# Patient Record
Sex: MALE | URBAN - METROPOLITAN AREA
[De-identification: names, ages, dates, MRNs, and addresses within clinical notes are randomized per-mention and may not be internally consistent; named-entity substitution may affect disease eponyms.]

---

## 2018-07-17 ENCOUNTER — OFFICE VISIT (OUTPATIENT)
Dept: FAMILY MEDICINE CLINIC | Age: 43
End: 2018-07-17

## 2018-07-17 VITALS
OXYGEN SATURATION: 98 % | DIASTOLIC BLOOD PRESSURE: 94 MMHG | BODY MASS INDEX: 27.78 KG/M2 | HEIGHT: 67 IN | TEMPERATURE: 98 F | WEIGHT: 177 LBS | SYSTOLIC BLOOD PRESSURE: 133 MMHG | RESPIRATION RATE: 16 BRPM | HEART RATE: 77 BPM

## 2018-07-17 DIAGNOSIS — F41.9 ANXIETY: ICD-10-CM

## 2018-07-17 DIAGNOSIS — R25.1 OCCASIONAL TREMORS: ICD-10-CM

## 2018-07-17 DIAGNOSIS — R03.0 ELEVATED BLOOD PRESSURE READING: ICD-10-CM

## 2018-07-17 DIAGNOSIS — R68.89 HEAT INTOLERANCE: ICD-10-CM

## 2018-07-17 DIAGNOSIS — E55.9 VITAMIN D DEFICIENCY: ICD-10-CM

## 2018-07-17 DIAGNOSIS — Z00.00 ANNUAL PHYSICAL EXAM: Primary | ICD-10-CM

## 2018-07-17 DIAGNOSIS — R00.2 PALPITATION: ICD-10-CM

## 2018-07-17 NOTE — PROGRESS NOTES
Claude Challenger. Chief Complaint   Patient presents with   Melania Hanson Other     has 4 symptoms related to thyroid issues. Vitals:    07/17/18 1342   BP: (!) 133/94   Pulse: 77   Resp: 16   Temp: 98 °F (36.7 °C)   TempSrc: Oral   SpO2: 98%   Weight: 177 lb (80.3 kg)   Height: 5' 7.2\" (1.707 m)   PainSc:   0 - No pain         HPI: Patient is here to establish care and to get an annual physical.  Also he has few complaints or concerns he has been having on and off palpitations, that comes and it comes on when he addressed, not associated with exertion, not associated with chest pain or shortness of breath and it takes a few minutes and then goes away. Also patient has excessive sweating and heat intolerance for many years. He has social and situational anxiety that associated with sweating and tremor in both hands. His VENKATA 7 score was 6. No unintentional weight loss or weight gain. Patient does not smoke cigarettes he quit 2 years ago and now smokes vape does not drink alcohol. Past Medical History:   Diagnosis Date    Prehypertension      History reviewed. No pertinent surgical history. Social History   Substance Use Topics    Smoking status: Former Smoker    Smokeless tobacco: Current User    Alcohol use Yes      Comment: very occassional       Family History   Problem Relation Age of Onset    Thyroid Disease Mother        Review of Systems   Constitutional: Negative for chills, fever, malaise/fatigue and weight loss. HENT: Negative for congestion, ear discharge, ear pain, hearing loss, nosebleeds, sinus pain and sore throat. Eyes: Negative for blurred vision, double vision and discharge. Respiratory: Negative for cough, hemoptysis, sputum production, shortness of breath and wheezing. Cardiovascular: Positive for palpitations. Negative for chest pain, claudication and leg swelling. Gastrointestinal: Negative for abdominal pain, constipation, diarrhea, nausea and vomiting. Genitourinary: Negative for dysuria, frequency, hematuria and urgency. Musculoskeletal: Negative for back pain, joint pain, myalgias and neck pain. Skin: Negative for itching and rash. Neurological: Positive for tremors. Negative for dizziness, tingling, sensory change, speech change, focal weakness, weakness and headaches. Psychiatric/Behavioral: Negative for depression, hallucinations, substance abuse and suicidal ideas. The patient is nervous/anxious. The patient does not have insomnia. Physical Exam   Constitutional: He is oriented to person, place, and time. He appears well-developed and well-nourished. No distress. HENT:   Head: Normocephalic and atraumatic. Mouth/Throat: No oropharyngeal exudate. Eyes: Conjunctivae are normal. Pupils are equal, round, and reactive to light. Right eye exhibits no discharge. Left eye exhibits no discharge. No scleral icterus. Neck: Normal range of motion. Neck supple. No thyromegaly present. Cardiovascular: Normal rate, regular rhythm and normal heart sounds. Pulmonary/Chest: Effort normal and breath sounds normal. No respiratory distress. He has no rales. Abdominal: Soft. Bowel sounds are normal. He exhibits no distension and no mass. There is no tenderness. There is no rebound. Musculoskeletal: Normal range of motion. He exhibits no edema, tenderness or deformity. Bilateral hand tremors    Lymphadenopathy:     He has no cervical adenopathy. Neurological: He is alert and oriented to person, place, and time. No cranial nerve deficit. Coordination normal.   Skin: Skin is warm and dry. No rash noted. He is not diaphoretic. No erythema. Psychiatric: He has a normal mood and affect. Judgment and thought content normal.        Assessment and plan     1. Heat intolerance    - TSH 3RD GENERATION; Future  - TSH 3RD GENERATION    2. Elevated blood pressure reading      3. Anxiety    - TSH 3RD GENERATION; Future  - TSH 3RD GENERATION    4. Palpitation   TSH 3RD GENERATION; Future  - TSH 3RD GENERATION    5. Annual physical exam    - CBC WITH AUTOMATED DIFF; Future  - LIPID PANEL; Future  - METABOLIC PANEL, COMPREHENSIVE; Future  - CBC WITH AUTOMATED DIFF  - LIPID PANEL  - METABOLIC PANEL, COMPREHENSIVE    6. Occasional tremors    - TSH 3RD GENERATION; Future  - VITAMIN B12; Future  - TSH 3RD GENERATION  - VITAMIN B12    7. Vitamin D deficiency    - VITAMIN D, 25 HYDROXY; Future  - VITAMIN D, 25 HYDROXY        There are no active problems to display for this patient. Results for orders placed or performed in visit on 12/11/17   URINE C&S   Result Value Ref Range    FINAL REPORT Microbiology results    AMB POC URINALYSIS DIP STICK AUTO W/O MICRO   Result Value Ref Range    Color (UA POC) Yellow     Clarity (UA POC) Clear     Glucose (UA POC) Negative Negative    Bilirubin (UA POC) Negative Negative    Ketones (UA POC) Negative Negative    Specific gravity (UA POC) 1.025 1.001 - 1.035    Blood (UA POC) Negative Negative    pH (UA POC) 5.0 4.6 - 8.0    Protein (UA POC) Negative Negative    Urobilinogen (UA POC) 0.2 mg/dL 0.2 - 1    Nitrites (UA POC) Negative Negative    Leukocyte esterase (UA POC) Negative Negative     No visits with results within 3 Month(s) from this visit.   Latest known visit with results is:    Office Visit on 12/11/2017   Component Date Value Ref Range Status    Color (UA POC) 12/11/2017 Yellow   Final    Clarity (UA POC) 12/11/2017 Clear   Final    Glucose (UA POC) 12/11/2017 Negative  Negative Final    Bilirubin (UA POC) 12/11/2017 Negative  Negative Final    Ketones (UA POC) 12/11/2017 Negative  Negative Final    Specific gravity (UA POC) 12/11/2017 1.025  1.001 - 1.035 Final    Blood (UA POC) 12/11/2017 Negative  Negative Final    pH (UA POC) 12/11/2017 5.0  4.6 - 8.0 Final    Protein (UA POC) 12/11/2017 Negative  Negative Final    Urobilinogen (UA POC) 12/11/2017 0.2 mg/dL  0.2 - 1 Final    Nitrites (UA POC) 12/11/2017 Negative  Negative Final    Leukocyte esterase (UA POC) 12/11/2017 Negative  Negative Final    FINAL REPORT 12/11/2017 Microbiology results   Final    Comment: ANTIBIOTIC ALLERGIES  Penicillin    URINE SOURCE:  Voided    COLONY COUNT / RESULT:   No Growth at 48 Hours            Follow-up Disposition:  Return in about 1 week (around 7/24/2018).

## 2018-07-17 NOTE — PROGRESS NOTES
Author . is a 41 y.o. male presents in office to be seen for thyroid issues. Health Maintenance Due   Topic Date Due    DTaP/Tdap/Td series (1 - Tdap) 04/28/1998       1. Have you been to the ER, urgent care clinic since your last visit? Hospitalized since your last visit?no    2. Have you seen or consulted any other health care providers outside of the [address] [place] [address] since your last visit? Include any pap smears or colon screening.  no

## 2018-07-17 NOTE — MR AVS SNAPSHOT
Zayra Banquete 
 
 
 [address] [facility] [address] 
[phone] Patient: Caitlin Balbuena. MRN: [id number] :1975 Visit Information Date & Time Provider Department Dept. Phone Encounter #  
 2018  1:45 PM Gaby Louise MD [facility]note 3] [facility] [phone] [id number] Follow-up Instructions Return in about 1 week (around 2018). Upcoming Health Maintenance Date Due DTaP/Tdap/Td series (1 - Tdap) 1998 Influenza Age 5 to Adult 2018 Allergies as of 2018  Review Complete On: 2018 By: Gaby Louise MD  
  
 Severity Noted Reaction Type Reactions Penicillin  2015    Itching Penicillin G  2017    Other (comments) Current Immunizations  Never Reviewed No immunizations on file. Not reviewed this visit You Were Diagnosed With   
  
 Codes Comments Annual physical exam    -  Primary ICD-10-CM: Z00.00 ICD-9-CM: V70.0 Heat intolerance     ICD-10-CM: R68.89 ICD-9-CM: 780.99 Elevated blood pressure reading     ICD-10-CM: R03.0 ICD-9-CM: 796.2 Anxiety     ICD-10-CM: F41.9 ICD-9-CM: 300.00 Palpitation     ICD-10-CM: R00.2 ICD-9-CM: 785.1 Occasional tremors     ICD-10-CM: R25.1 ICD-9-CM: 424. 0 Vitamin D deficiency     ICD-10-CM: E55.9 ICD-9-CM: 268.9 Vitals BP Pulse Temp Resp Height(growth percentile) Weight(growth percentile) (!) 133/94 77 98 °F (36.7 °C) (Oral) 16 5' 7.2\" (1.707 m) 177 lb (80.3 kg) SpO2 BMI Smoking Status 98% 27.56 kg/m2 Former Smoker Vitals History BMI and BSA Data Body Mass Index Body Surface Area  
 27.56 kg/m 2 1.95 m 2 Preferred Pharmacy Pharmacy Name Phone [address], [address] [place] AT [address] [phone] Your Updated Medication List  
  
 Notice  As of 7/17/2018  2:29 PM  
 You have not been prescribed any medications. We Performed the Following CBC WITH AUTOMATED DIFF [80352 CPT(R)] LIPID PANEL [68191 CPT(R)] METABOLIC PANEL, COMPREHENSIVE [23929 CPT(R)] TSH 3RD GENERATION [49013 CPT(R)] VITAMIN B12 [id number] CPT(R)] VITAMIN D, 25 HYDROXY [id number] CPT(R)] Follow-up Instructions Return in about 1 week (around 7/24/2018). To-Do List   
 07/17/2018 Lab:  CBC WITH AUTOMATED DIFF   
  
 07/17/2018 Lab:  LIPID PANEL   
  
 07/17/2018 Lab:  METABOLIC PANEL, COMPREHENSIVE   
  
 07/17/2018 Lab:  TSH 3RD GENERATION   
  
 07/17/2018 Lab:  VITAMIN B12   
  
 07/17/2018 Lab:  VITAMIN D, 25 HYDROXY Introducing [facility]! Napoleon Villagran introduces [employer] patient portal. Now you can access parts of your medical record, email your doctor's office, and request medication refills online. 1. In your internet browser, go to [email or web address] 2. Click on the First Time User? Click Here link in the Sign In box. You will see the New Member Sign Up page. 3. Enter your [employer] Access Code exactly as it appears below. You will not need to use this code after youve completed the sign-up process. If you do not sign up before the expiration date, you must request a new code. · [employer] Access Code: [email or web address] Expires: 10/15/2018  2:19 PM 
 
4. Enter the last four digits of your Social Security Number (xxxx) and Date of Birth (mm/dd/yyyy) as indicated and click Submit. You will be taken to the next sign-up page. 5. Create a [employer] ID. This will be your [employer] login ID and cannot be changed, so think of one that is secure and easy to remember. 6. Create a [employer] password. You can change your password at any time. 7. Enter your Password Reset Question and Answer. This can be used at a later time if you forget your password. 8. Enter your e-mail address. You will receive e-mail notification when new information is available in [address] 9. Click Sign Up. You can now view and download portions of your medical record. 10. Click the Download Summary menu link to download a portable copy of your medical information. If you have questions, please visit the Frequently Asked Questions section of the [employer] website. Remember, [employer] is NOT to be used for urgent needs. For medical emergencies, dial 911. Now available from your iPhone and Android! Please provide this summary of care documentation to your next provider. If you have any questions after today's visit, please call [phone].

## 2018-07-18 LAB
25(OH)D3 SERPL-MCNC: 20.4 NG/ML (ref 32–100)
A-G RATIO,AGRAT: 2.3 RATIO (ref 1.1–2.6)
ABSOLUTE LYMPHOCYTE COUNT, 10803: 1.8 K/UL (ref 1–4.8)
ALBUMIN SERPL-MCNC: 4.9 G/DL (ref 3.5–5)
ALP SERPL-CCNC: 80 U/L (ref 25–115)
ALT SERPL-CCNC: 32 U/L (ref 5–40)
ANION GAP SERPL CALC-SCNC: 19 MMOL/L
AST SERPL W P-5'-P-CCNC: 20 U/L (ref 10–37)
BASOPHILS # BLD: 0 K/UL (ref 0–0.2)
BASOPHILS NFR BLD: 1 % (ref 0–2)
BILIRUB SERPL-MCNC: 0.6 MG/DL (ref 0.2–1.2)
BUN SERPL-MCNC: 12 MG/DL (ref 6–22)
CALCIUM SERPL-MCNC: 9.7 MG/DL (ref 8.4–10.4)
CHLORIDE SERPL-SCNC: 99 MMOL/L (ref 98–110)
CHOLEST SERPL-MCNC: 201 MG/DL (ref 110–200)
CO2 SERPL-SCNC: 24 MMOL/L (ref 20–32)
CREAT SERPL-MCNC: 0.9 MG/DL (ref 0.5–1.2)
EOSINOPHIL # BLD: 0.1 K/UL (ref 0–0.5)
EOSINOPHIL NFR BLD: 2 % (ref 0–6)
ERYTHROCYTE [DISTWIDTH] IN BLOOD BY AUTOMATED COUNT: 13.4 % (ref 10–15.5)
GFRAA, 66117: >60
GFRNA, 66118: >60
GLOBULIN,GLOB: 2.1 G/DL (ref 2–4)
GLUCOSE SERPL-MCNC: 91 MG/DL (ref 70–99)
GRANULOCYTES,GRANS: 62 % (ref 40–75)
HCT VFR BLD AUTO: 44.7 % (ref 36.6–51.9)
HDLC SERPL-MCNC: 4.3 MG/DL (ref 0–5)
HDLC SERPL-MCNC: 47 MG/DL (ref 40–59)
HGB BLD-MCNC: 14.7 G/DL (ref 13.2–17.3)
LDLC SERPL CALC-MCNC: 131 MG/DL (ref 50–99)
LYMPHOCYTES, LYMLT: 26 % (ref 20–45)
MCH RBC QN AUTO: 30 PG (ref 26–34)
MCHC RBC AUTO-ENTMCNC: 33 G/DL (ref 31–36)
MCV RBC AUTO: 92 FL (ref 80–95)
MONOCYTES # BLD: 0.7 K/UL (ref 0.1–1)
MONOCYTES NFR BLD: 10 % (ref 3–12)
NEUTROPHILS # BLD AUTO: 4.3 K/UL (ref 1.8–7.7)
PLATELET # BLD AUTO: 348 K/UL (ref 140–440)
PMV BLD AUTO: 10.2 FL (ref 9–13)
POTASSIUM SERPL-SCNC: 4.6 MMOL/L (ref 3.5–5.5)
PROT SERPL-MCNC: 7 G/DL (ref 6.4–8.3)
RBC # BLD AUTO: 4.84 M/UL (ref 3.8–5.8)
SODIUM SERPL-SCNC: 142 MMOL/L (ref 133–145)
TRIGL SERPL-MCNC: 115 MG/DL (ref 40–149)
TSH SERPL DL<=0.005 MIU/L-ACNC: 1.72 MCU/ML (ref 0.27–4.2)
VIT B12 SERPL-MCNC: 390 PG/ML (ref 211–911)
VLDLC SERPL CALC-MCNC: 23 MG/DL (ref 8–30)
WBC # BLD AUTO: 7 K/UL (ref 4–11)

## 2018-07-24 ENCOUNTER — OFFICE VISIT (OUTPATIENT)
Dept: FAMILY MEDICINE CLINIC | Age: 43
End: 2018-07-24

## 2018-07-24 VITALS
WEIGHT: 181 LBS | TEMPERATURE: 97.8 F | DIASTOLIC BLOOD PRESSURE: 89 MMHG | RESPIRATION RATE: 17 BRPM | HEART RATE: 67 BPM | SYSTOLIC BLOOD PRESSURE: 136 MMHG | BODY MASS INDEX: 28.41 KG/M2 | OXYGEN SATURATION: 98 % | HEIGHT: 67 IN

## 2018-07-24 DIAGNOSIS — R03.0 ELEVATED BLOOD PRESSURE READING: ICD-10-CM

## 2018-07-24 DIAGNOSIS — E66.3 OVERWEIGHT (BMI 25.0-29.9): ICD-10-CM

## 2018-07-24 DIAGNOSIS — E55.9 VITAMIN D DEFICIENCY: ICD-10-CM

## 2018-07-24 DIAGNOSIS — R61 EXCESSIVE SWEATING: ICD-10-CM

## 2018-07-24 DIAGNOSIS — G25.0 TREMOR, ESSENTIAL: Primary | ICD-10-CM

## 2018-07-24 RX ORDER — NEBIVOLOL 5 MG/1
5 TABLET ORAL DAILY
Qty: 30 TAB | Refills: 2 | Status: SHIPPED | OUTPATIENT
Start: 2018-07-24 | End: 2018-08-02 | Stop reason: CLARIF

## 2018-07-24 RX ORDER — ERGOCALCIFEROL 1.25 MG/1
50000 CAPSULE ORAL
Qty: 12 CAP | Refills: 0 | Status: SHIPPED | OUTPATIENT
Start: 2018-07-24 | End: 2018-10-22

## 2018-07-24 NOTE — MR AVS SNAPSHOT
Keaton La Plata 
 
 
 [address] [facility] [address] 
[phone] Patient: Ghazal Hurley MRN: [id number] :1975 Visit Information Date & Time Provider Department Dept. Phone Encounter #  
 2018  9:00 AM Edna Rodriguez MD [facility] [phone] [id number] Upcoming Health Maintenance Date Due DTaP/Tdap/Td series (1 - Tdap) 1998 Influenza Age 5 to Adult 2018 Allergies as of 2018  Review Complete On: 2018 By: Edna Rodriguez MD  
  
 Severity Noted Reaction Type Reactions Penicillin  2015    Itching Penicillin G  2017    Other (comments) Current Immunizations  Never Reviewed No immunizations on file. Not reviewed this visit You Were Diagnosed With   
  
 Codes Comments Tremor, essential    -  Primary ICD-10-CM: G25.0 ICD-9-CM: 333.1 Vitamin D deficiency     ICD-10-CM: E55.9 ICD-9-CM: 268.9 Overweight (BMI 25.0-29. 9)     ICD-10-CM: C87.6 ICD-9-CM: 278.02 Excessive sweating     ICD-10-CM: R61 
ICD-9-CM: 780.8 Elevated blood pressure reading     ICD-10-CM: R03.0 ICD-9-CM: 796.2 Vitals BP Pulse Temp Resp Height(growth percentile) Weight(growth percentile) 136/89 67 97.8 °F (36.6 °C) (Oral) 17 5' 7\" (1.702 m) 181 lb (82.1 kg) SpO2 BMI Smoking Status 98% 28.35 kg/m2 Former Smoker Vitals History BMI and BSA Data Body Mass Index Body Surface Area  
 28.35 kg/m 2 1.97 m 2 Preferred Pharmacy Pharmacy Name Phone [address] [facility], [address] [place] [address] AT [address] [phone] Your Updated Medication List  
  
Notice  As of 2018  9:45 AM  
 You have not been prescribed any medications. Introducing [facility]! [facility] introduces [employer] patient portal. Now you can access parts of your medical record, email your doctor's office, and request medication refills online. 1. In your internet browser, go to [email or web address] 2. Click on the First Time User? Click Here link in the Sign In box. You will see the New Member Sign Up page. 3. Enter your [employer] Access Code exactly as it appears below. You will not need to use this code after youve completed the sign-up process. If you do not sign up before the expiration date, you must request a new code. · [employer] Access Code: [email or web address] Expires: 10/15/2018  2:19 PM 
 
4. Enter the last four digits of your Social Security Number (xxxx) and Date of Birth (mm/dd/yyyy) as indicated and click Submit. You will be taken to the next sign-up page. 5. Create a [employer] ID. This will be your [employer] login ID and cannot be changed, so think of one that is secure and easy to remember. 6. Create a [employer] password. You can change your password at any time. 7. Enter your Password Reset Question and Answer. This can be used at a later time if you forget your password. 8. Enter your e-mail address. You will receive e-mail notification when new information is available in [address] 9. Click Sign Up. You can now view and download portions of your medical record. 10. Click the Download Summary menu link to download a portable copy of your medical information. If you have questions, please visit the Frequently Asked Questions section of the [employer] website. Remember, [employer] is NOT to be used for urgent needs. For medical emergencies, dial 911. Now available from your iPhone and Android! Please provide this summary of care documentation to your next provider. If you have any questions after today's visit, please call [phone].

## 2018-07-24 NOTE — PROGRESS NOTES
Sharlene Brenner. Chief Complaint   Patient presents with    Hypertension     Vitals:    07/24/18 0856   BP: 136/89   Pulse: 67   Resp: 17   Temp: 97.8 °F (36.6 °C)   TempSrc: Oral   SpO2: 98%   Weight: 181 lb (82.1 kg)   Height: 5' 7\" (1.702 m)   PainSc:   0 - No pain         HPI: Patient is here to follow-up on his lab results, as well as to follow-up on his anxiety, tremors and excessive sweating. Results discussed with the patient low vitamin D he need them 50,000 units weekly. Slightly elevated cholesterol and LDL level advised lifestyle modification cardio exercise and low-fat and low sugar diet. Otherwise all his blood work is within normal limits, patient will be started on beta-blocker for control of his tremors excessive sweating that secondary to his anxiety and will follow-up in 1 month. Past Medical History:   Diagnosis Date    Prehypertension      No past surgical history on file. Social History   Substance Use Topics    Smoking status: Former Smoker    Smokeless tobacco: Current User    Alcohol use Yes      Comment: very occassional       Family History   Problem Relation Age of Onset    Thyroid Disease Mother        Review of Systems   Constitutional: Negative for chills, fever, malaise/fatigue and weight loss. HENT: Negative for congestion, ear discharge, ear pain, hearing loss, nosebleeds, sinus pain and sore throat. Eyes: Negative for blurred vision, double vision and discharge. Respiratory: Negative for cough, hemoptysis, sputum production, shortness of breath and wheezing. Cardiovascular: Positive for palpitations. Negative for chest pain, claudication and leg swelling. Gastrointestinal: Negative for abdominal pain, constipation, diarrhea, nausea and vomiting. Genitourinary: Negative for dysuria, frequency and urgency. Musculoskeletal: Negative for back pain, joint pain, myalgias and neck pain. Skin: Negative for itching and rash.    Neurological: Negative for dizziness, tingling, sensory change, speech change, focal weakness, seizures, weakness and headaches. Psychiatric/Behavioral: Negative for depression, hallucinations, substance abuse and suicidal ideas. The patient is not nervous/anxious and does not have insomnia. Physical Exam   Constitutional: He is oriented to person, place, and time. He appears well-developed and well-nourished. No distress. HENT:   Head: Normocephalic and atraumatic. Eyes: Conjunctivae are normal. Pupils are equal, round, and reactive to light. Right eye exhibits no discharge. Left eye exhibits no discharge. No scleral icterus. Neck: Normal range of motion. Neck supple. Cardiovascular: Normal rate, regular rhythm and normal heart sounds. Pulmonary/Chest: Effort normal.   Musculoskeletal: Normal range of motion. He exhibits no edema, tenderness or deformity. Neurological: He is alert and oriented to person, place, and time. Skin: Skin is warm and dry. No rash noted. He is not diaphoretic. No erythema. No pallor. Psychiatric: He has a normal mood and affect. Judgment and thought content normal.        Assessment and plan     1. Vitamin D deficiency    - ergocalciferol (VITAMIN D2) 50,000 unit capsule; Take 1 Cap by mouth every seven (7) days for 90 days. Dispense: 12 Cap; Refill: 0    2. Overweight (BMI 25.0-29. 9)    I have reviewed/discussed the above normal BMI with the patient. I have recommended the following interventions: dietary management education, guidance, and counseling, encourage exercise and monitor weight . .      3. Tremor, essential    - nebivolol (BYSTOLIC) 5 mg tablet; Take 1 Tab by mouth daily. Dispense: 30 Tab; Refill: 2    4. Excessive sweating    - nebivolol (BYSTOLIC) 5 mg tablet; Take 1 Tab by mouth daily. Dispense: 30 Tab; Refill: 2    5. Elevated blood pressure reading    - nebivolol (BYSTOLIC) 5 mg tablet; Take 1 Tab by mouth daily. Dispense: 30 Tab;  Refill: 2    Current Outpatient Prescriptions   Medication Sig Dispense Refill    ergocalciferol (VITAMIN D2) 50,000 unit capsule Take 1 Cap by mouth every seven (7) days for 90 days. 12 Cap 0    nebivolol (BYSTOLIC) 5 mg tablet Take 1 Tab by mouth daily. 30 Tab 2       Patient Active Problem List    Diagnosis Date Noted    Vitamin D deficiency 07/24/2018    Overweight (BMI 25.0-29.9) 07/24/2018    Tremor, essential 07/24/2018    Excessive sweating 07/24/2018    Elevated blood pressure reading 07/24/2018     Results for orders placed or performed in visit on 07/17/18   CBC WITH AUTOMATED DIFF   Result Value Ref Range    WBC 7.0 4.0 - 11.0 K/uL    RBC 4.84 3.80 - 5.80 M/uL    HGB 14.7 13.2 - 17.3 g/dL    HCT 44.7 36.6 - 51.9 %    MCV 92 80 - 95 fL    MCH 30 26 - 34 pg    MCHC 33 31 - 36 g/dL    RDW 13.4 10.0 - 15.5 %    PLATELET 754 441 - 764 K/uL    MPV 10.2 9.0 - 13.0 fL    NEUTROPHILS 62 40 - 75 %    Lymphocytes 26 20 - 45 %    MONOCYTES 10 3 - 12 %    EOSINOPHILS 2 0 - 6 %    BASOPHILS 1 0 - 2 %    ABS. NEUTROPHILS 4.3 1.8 - 7.7 K/uL    ABSOLUTE LYMPHOCYTE COUNT 1.8 1.0 - 4.8 K/uL    ABS. MONOCYTES 0.7 0.1 - 1.0 K/uL    ABS. EOSINOPHILS 0.1 0.0 - 0.5 K/uL    ABS. BASOPHILS 0.0 0.0 - 0.2 K/uL   TSH 3RD GENERATION   Result Value Ref Range    TSH 1.72 0.27 - 4.20 mcU/mL   LIPID PANEL   Result Value Ref Range    Triglyceride 115 40 - 149 mg/dL    HDL Cholesterol 47 40 - 59 mg/dL    Cholesterol, total 201 (H) 110 - 200 mg/dL    CHOLESTEROL/HDL 4.3 0.0 - 5.0    LDL, calculated 131 (H) 50 - 99 mg/dL    VLDL, calculated 23 8 - 30 mg/dL   METABOLIC PANEL, COMPREHENSIVE   Result Value Ref Range    Glucose 91 70 - 99 mg/dL    BUN 12 6 - 22 mg/dL    Creatinine 0.9 0.5 - 1.2 mg/dL    Sodium 142 133 - 145 mmol/L    Potassium 4.6 3.5 - 5.5 mmol/L    Chloride 99 98 - 110 mmol/L    CO2 24 20 - 32 mmol/L    AST (SGOT) 20 10 - 37 U/L    ALT (SGPT) 32 5 - 40 U/L    Alk.  phosphatase 80 25 - 115 U/L    Bilirubin, total 0.6 0.2 - 1.2 mg/dL Calcium 9.7 8.4 - 10.4 mg/dL    Protein, total 7.0 6.4 - 8.3 g/dL    Albumin 4.9 3.5 - 5.0 g/dL    A-G Ratio 2.3 1.1 - 2.6 ratio    Globulin 2.1 2.0 - 4.0 g/dL    Anion gap 19.0 mmol/L    GFRAA >60.0 >60.0    GFRNA >60.0 >60.0   VITAMIN B12   Result Value Ref Range    Vitamin B12 390 211 - 911 pg/mL   VITAMIN D, 25 HYDROXY   Result Value Ref Range    VITAMIN D, 25-HYDROXY 20.4 (L) 32.0 - 100.0 ng/mL     Office Visit on 07/17/2018   Component Date Value Ref Range Status    WBC 07/17/2018 7.0  4.0 - 11.0 K/uL Final    RBC 07/17/2018 4.84  3.80 - 5.80 M/uL Final    HGB 07/17/2018 14.7  13.2 - 17.3 g/dL Final    HCT 07/17/2018 44.7  36.6 - 51.9 % Final    MCV 07/17/2018 92  80 - 95 fL Final    MCH 07/17/2018 30  26 - 34 pg Final    MCHC 07/17/2018 33  31 - 36 g/dL Final    RDW 07/17/2018 13.4  10.0 - 15.5 % Final    PLATELET 49/50/9992 915  140 - 440 K/uL Final    MPV 07/17/2018 10.2  9.0 - 13.0 fL Final    NEUTROPHILS 07/17/2018 62  40 - 75 % Final    Lymphocytes 07/17/2018 26  20 - 45 % Final    MONOCYTES 07/17/2018 10  3 - 12 % Final    EOSINOPHILS 07/17/2018 2  0 - 6 % Final    BASOPHILS 07/17/2018 1  0 - 2 % Final    ABS. NEUTROPHILS 07/17/2018 4.3  1.8 - 7.7 K/uL Final    ABSOLUTE LYMPHOCYTE COUNT 07/17/2018 1.8  1.0 - 4.8 K/uL Final    ABS. MONOCYTES 07/17/2018 0.7  0.1 - 1.0 K/uL Final    ABS. EOSINOPHILS 07/17/2018 0.1  0.0 - 0.5 K/uL Final    ABS. BASOPHILS 07/17/2018 0.0  0.0 - 0.2 K/uL Final    TSH 07/17/2018 1.72  0.27 - 4.20 mcU/mL Final    Triglyceride 07/17/2018 115  40 - 149 mg/dL Final    HDL Cholesterol 07/17/2018 47  40 - 59 mg/dL Final    Cholesterol, total 07/17/2018 201* 110 - 200 mg/dL Final    CHOLESTEROL/HDL 07/17/2018 4.3  0.0 - 5.0 Final    LDL, calculated 07/17/2018 131* 50 - 99 mg/dL Final    VLDL, calculated 07/17/2018 23  8 - 30 mg/dL Final    Comment: Test includes cholesterol, HDL cholesterol, triglycerides and LDL.   Cholesterol Recommended NCEP guidelines in mg/dL:  Less than 200            Desirable  200 - 239                Borderline High  Greater than or  = 240   High  Please Note:  Total Chol/HDL Ratio                   Men     Women  1/2 Avg. Risk    3.4     3.3      Avg. Risk    5.0     4.4  2X  Avg. Risk    9.6     7.1  3X  Avg. Risk   23.4    11.0      Glucose 07/17/2018 91  70 - 99 mg/dL Final    BUN 07/17/2018 12  6 - 22 mg/dL Final    Creatinine 07/17/2018 0.9  0.5 - 1.2 mg/dL Final    Sodium 07/17/2018 142  133 - 145 mmol/L Final    Potassium 07/17/2018 4.6  3.5 - 5.5 mmol/L Final    Chloride 07/17/2018 99  98 - 110 mmol/L Final    CO2 07/17/2018 24  20 - 32 mmol/L Final    AST (SGOT) 07/17/2018 20  10 - 37 U/L Final    ALT (SGPT) 07/17/2018 32  5 - 40 U/L Final    Alk. phosphatase 07/17/2018 80  25 - 115 U/L Final    Bilirubin, total 07/17/2018 0.6  0.2 - 1.2 mg/dL Final    Calcium 07/17/2018 9.7  8.4 - 10.4 mg/dL Final    Protein, total 07/17/2018 7.0  6.4 - 8.3 g/dL Final    Albumin 07/17/2018 4.9  3.5 - 5.0 g/dL Final    A-G Ratio 07/17/2018 2.3  1.1 - 2.6 ratio Final    Globulin 07/17/2018 2.1  2.0 - 4.0 g/dL Final    Anion gap 07/17/2018 19.0  mmol/L Final    Comment: Test includes Albumin, Alkaline Phosphatase, ALT, AST, BUN, Calcium, CO2,  Chloride, Creatinine, Glucose, Potassium, Sodium, Total Bilirubin and Total  Protein. Estimated GFR results are reported in mL/min/1.73 sq.m. by the MDRD equation. This eGFR is validated for stable chronic renal failure patients. This   equation  is unreliable in acute illness or patients with normal renal function.  GFRAA 07/17/2018 >60.0  >60.0 Final    GFRNA 07/17/2018 >60.0  >60.0 Final    Vitamin B12 07/17/2018 390  211 - 911 pg/mL Final    VITAMIN D, 25-HYDROXY 07/17/2018 20.4* 32.0 - 100.0 ng/mL Final          Follow-up Disposition:  Return in about 1 month (around 8/24/2018).

## 2018-08-23 ENCOUNTER — OFFICE VISIT (OUTPATIENT)
Dept: FAMILY MEDICINE CLINIC | Age: 43
End: 2018-08-23

## 2018-08-23 VITALS
SYSTOLIC BLOOD PRESSURE: 130 MMHG | OXYGEN SATURATION: 100 % | DIASTOLIC BLOOD PRESSURE: 90 MMHG | HEIGHT: 67 IN | WEIGHT: 182 LBS | HEART RATE: 60 BPM | BODY MASS INDEX: 28.56 KG/M2 | RESPIRATION RATE: 16 BRPM | TEMPERATURE: 97.3 F

## 2018-08-23 DIAGNOSIS — E55.9 VITAMIN D DEFICIENCY: ICD-10-CM

## 2018-08-23 DIAGNOSIS — R03.0 ELEVATED BLOOD PRESSURE READING: ICD-10-CM

## 2018-08-23 DIAGNOSIS — G25.0 TREMOR, ESSENTIAL: Primary | ICD-10-CM

## 2018-08-23 NOTE — PROGRESS NOTES
Brian Butts is a 41 y.o. male (: 1975) presenting to address:    Chief Complaint   Patient presents with    Tremors    Excessive Sweating    Vitamin D Deficiency         Medication list has been reviewed with Brian SalvadorkearaleonidMohsen and updated as of today's date     Patient has been asked if refills needed as of today's date in which they replied;  NO    Health Maintenance items with a due date reviewed with patient:  Health Maintenance Due   Topic Date Due    DTaP/Tdap/Td series (1 - Tdap) 1998    Influenza Age 5 to Adult  2018         Hearing/Vision:   No exam data present    Learning Assessment:     Learning Assessment 2018   PRIMARY LEARNER Patient   BARRIERS PRIMARY LEARNER NONE   PRIMARY LANGUAGE ENGLISH   LEARNER PREFERENCE PRIMARY LISTENING     READING     DEMONSTRATION   ANSWERED BY patient   RELATIONSHIP SELF       Depression Screening:     PHQ over the last two weeks 2018   Little interest or pleasure in doing things Not at all   Feeling down, depressed, irritable, or hopeless Not at all   Total Score PHQ 2 0       Fall Risk Assessment:     Fall Risk Assessment, last 12 mths 2018   Able to walk? Yes   Fall in past 12 months? No       Abuse Screening:     Abuse Screening Questionnaire 2018   Do you ever feel afraid of your partner? N   Are you in a relationship with someone who physically or mentally threatens you? N   Is it safe for you to go home? Y       Coordination of Care Questionaire:   1. Have you been to the ER, urgent care clinic since your last visit? Hospitalized since your last visit?  NO

## 2018-08-23 NOTE — PROGRESS NOTES
Alfredito Jewell. Chief Complaint   Patient presents with    Tremors    Excessive Sweating    Vitamin D Deficiency     Vitals:    08/23/18 0854   BP: 130/90   Pulse: 60   Resp: 16   Temp: 97.3 °F (36.3 °C)   TempSrc: Oral   SpO2: 100%   Weight: 182 lb (82.6 kg)   Height: 5' 7\" (1.702 m)   PainSc:   0 - No pain         HPI: Patient is here to follow-up on his excessive sweating, tremors and elevated blood pressure. He has been taking propranolol 20 mg twice daily, it causing him dizziness, it is not improving his excessive sweating or his tremors, and his blood pressure stayed the same. So the patient is planning to stop the medication just try with his anxiety use lifestyle modification and exercise. Patient seems less distressed with resisted a kick with his stressful job and now he is looking for a new job. Blood pressure today is almost the same as last visit 130/90. He has no headache no nausea no vomiting no chest pain or shortness of breath or blurred vision    Past Medical History:   Diagnosis Date    Prehypertension      Past Surgical History:   Procedure Laterality Date    HX WISDOM TEETH EXTRACTION       Social History   Substance Use Topics    Smoking status: Former Smoker    Smokeless tobacco: Current User    Alcohol use Yes      Comment: very occassional       Family History   Problem Relation Age of Onset    Thyroid Disease Mother        Review of Systems   Constitutional: Negative for chills, fever, malaise/fatigue and weight loss. HENT: Negative for congestion, ear discharge, ear pain, hearing loss and nosebleeds. Eyes: Negative for blurred vision, double vision and discharge. Respiratory: Negative for cough and shortness of breath. Cardiovascular: Negative for chest pain, palpitations, claudication and leg swelling. Gastrointestinal: Negative for abdominal pain, constipation, diarrhea, nausea and vomiting.    Genitourinary: Negative for dysuria, frequency and urgency. Musculoskeletal: Negative for back pain, joint pain, myalgias and neck pain. Skin: Negative for itching and rash. Neurological: Negative for dizziness, tingling, sensory change, speech change, focal weakness, weakness and headaches. Psychiatric/Behavioral: Negative for depression, hallucinations, substance abuse and suicidal ideas. The patient is not nervous/anxious and does not have insomnia. Physical Exam   Constitutional: He is oriented to person, place, and time. He appears well-developed and well-nourished. No distress. HENT:   Head: Normocephalic and atraumatic. Mouth/Throat: No oropharyngeal exudate. Eyes: Conjunctivae are normal. Pupils are equal, round, and reactive to light. Right eye exhibits no discharge. Left eye exhibits no discharge. No scleral icterus. Musculoskeletal: Normal range of motion. He exhibits no deformity. Neurological: He is alert and oriented to person, place, and time. Coordination normal.   Skin: Skin is warm and dry. No rash noted. He is not diaphoretic. No erythema. Psychiatric: He has a normal mood and affect. Judgment and thought content normal.        Assessment and plan     1. Vitamin D deficiency  Continue vitamin D supplement    2. Elevated blood pressure reading  Lifestyle modification patient will stop the propranolol    Advised to follow-up in 1 month or if having any symptoms    3. Tremor, essential  Patient is not interested in pharmacological therapy at this time    Current Outpatient Prescriptions   Medication Sig Dispense Refill    propranolol (INDERAL) 20 mg tablet Take 1 Tab by mouth two (2) times a day for 60 days. 60 Tab 1    ergocalciferol (VITAMIN D2) 50,000 unit capsule Take 1 Cap by mouth every seven (7) days for 90 days.  12 Cap 0       Patient Active Problem List    Diagnosis Date Noted    Vitamin D deficiency 07/24/2018    Overweight (BMI 25.0-29.9) 07/24/2018    Tremor, essential 07/24/2018    Excessive sweating 07/24/2018    Elevated blood pressure reading 07/24/2018     Results for orders placed or performed in visit on 07/17/18   CBC WITH AUTOMATED DIFF   Result Value Ref Range    WBC 7.0 4.0 - 11.0 K/uL    RBC 4.84 3.80 - 5.80 M/uL    HGB 14.7 13.2 - 17.3 g/dL    HCT 44.7 36.6 - 51.9 %    MCV 92 80 - 95 fL    MCH 30 26 - 34 pg    MCHC 33 31 - 36 g/dL    RDW 13.4 10.0 - 15.5 %    PLATELET 639 935 - 182 K/uL    MPV 10.2 9.0 - 13.0 fL    NEUTROPHILS 62 40 - 75 %    Lymphocytes 26 20 - 45 %    MONOCYTES 10 3 - 12 %    EOSINOPHILS 2 0 - 6 %    BASOPHILS 1 0 - 2 %    ABS. NEUTROPHILS 4.3 1.8 - 7.7 K/uL    ABSOLUTE LYMPHOCYTE COUNT 1.8 1.0 - 4.8 K/uL    ABS. MONOCYTES 0.7 0.1 - 1.0 K/uL    ABS. EOSINOPHILS 0.1 0.0 - 0.5 K/uL    ABS. BASOPHILS 0.0 0.0 - 0.2 K/uL   TSH 3RD GENERATION   Result Value Ref Range    TSH 1.72 0.27 - 4.20 mcU/mL   LIPID PANEL   Result Value Ref Range    Triglyceride 115 40 - 149 mg/dL    HDL Cholesterol 47 40 - 59 mg/dL    Cholesterol, total 201 (H) 110 - 200 mg/dL    CHOLESTEROL/HDL 4.3 0.0 - 5.0    LDL, calculated 131 (H) 50 - 99 mg/dL    VLDL, calculated 23 8 - 30 mg/dL   METABOLIC PANEL, COMPREHENSIVE   Result Value Ref Range    Glucose 91 70 - 99 mg/dL    BUN 12 6 - 22 mg/dL    Creatinine 0.9 0.5 - 1.2 mg/dL    Sodium 142 133 - 145 mmol/L    Potassium 4.6 3.5 - 5.5 mmol/L    Chloride 99 98 - 110 mmol/L    CO2 24 20 - 32 mmol/L    AST (SGOT) 20 10 - 37 U/L    ALT (SGPT) 32 5 - 40 U/L    Alk.  phosphatase 80 25 - 115 U/L    Bilirubin, total 0.6 0.2 - 1.2 mg/dL    Calcium 9.7 8.4 - 10.4 mg/dL    Protein, total 7.0 6.4 - 8.3 g/dL    Albumin 4.9 3.5 - 5.0 g/dL    A-G Ratio 2.3 1.1 - 2.6 ratio    Globulin 2.1 2.0 - 4.0 g/dL    Anion gap 19.0 mmol/L    GFRAA >60.0 >60.0    GFRNA >60.0 >60.0   VITAMIN B12   Result Value Ref Range    Vitamin B12 390 211 - 911 pg/mL   VITAMIN D, 25 HYDROXY   Result Value Ref Range    VITAMIN D, 25-HYDROXY 20.4 (L) 32.0 - 100.0 ng/mL     Office Visit on 07/17/2018 Component Date Value Ref Range Status    WBC 07/17/2018 7.0  4.0 - 11.0 K/uL Final    RBC 07/17/2018 4.84  3.80 - 5.80 M/uL Final    HGB 07/17/2018 14.7  13.2 - 17.3 g/dL Final    HCT 07/17/2018 44.7  36.6 - 51.9 % Final    MCV 07/17/2018 92  80 - 95 fL Final    MCH 07/17/2018 30  26 - 34 pg Final    MCHC 07/17/2018 33  31 - 36 g/dL Final    RDW 07/17/2018 13.4  10.0 - 15.5 % Final    PLATELET 66/57/8537 078  140 - 440 K/uL Final    MPV 07/17/2018 10.2  9.0 - 13.0 fL Final    NEUTROPHILS 07/17/2018 62  40 - 75 % Final    Lymphocytes 07/17/2018 26  20 - 45 % Final    MONOCYTES 07/17/2018 10  3 - 12 % Final    EOSINOPHILS 07/17/2018 2  0 - 6 % Final    BASOPHILS 07/17/2018 1  0 - 2 % Final    ABS. NEUTROPHILS 07/17/2018 4.3  1.8 - 7.7 K/uL Final    ABSOLUTE LYMPHOCYTE COUNT 07/17/2018 1.8  1.0 - 4.8 K/uL Final    ABS. MONOCYTES 07/17/2018 0.7  0.1 - 1.0 K/uL Final    ABS. EOSINOPHILS 07/17/2018 0.1  0.0 - 0.5 K/uL Final    ABS. BASOPHILS 07/17/2018 0.0  0.0 - 0.2 K/uL Final    TSH 07/17/2018 1.72  0.27 - 4.20 mcU/mL Final    Triglyceride 07/17/2018 115  40 - 149 mg/dL Final    HDL Cholesterol 07/17/2018 47  40 - 59 mg/dL Final    Cholesterol, total 07/17/2018 201* 110 - 200 mg/dL Final    CHOLESTEROL/HDL 07/17/2018 4.3  0.0 - 5.0 Final    LDL, calculated 07/17/2018 131* 50 - 99 mg/dL Final    VLDL, calculated 07/17/2018 23  8 - 30 mg/dL Final    Comment: Test includes cholesterol, HDL cholesterol, triglycerides and LDL. Cholesterol Recommended NCEP guidelines in mg/dL:  Less than 200            Desirable  200 - 239                Borderline High  Greater than or  = 240   High  Please Note:  Total Chol/HDL Ratio                   Men     Women  1/2 Avg. Risk    3.4     3.3      Avg. Risk    5.0     4.4  2X  Avg. Risk    9.6     7.1  3X  Avg.  Risk   23.4    11.0      Glucose 07/17/2018 91  70 - 99 mg/dL Final    BUN 07/17/2018 12  6 - 22 mg/dL Final    Creatinine 07/17/2018 0.9  0.5 - 1.2 mg/dL Final    Sodium 07/17/2018 142  133 - 145 mmol/L Final    Potassium 07/17/2018 4.6  3.5 - 5.5 mmol/L Final    Chloride 07/17/2018 99  98 - 110 mmol/L Final    CO2 07/17/2018 24  20 - 32 mmol/L Final    AST (SGOT) 07/17/2018 20  10 - 37 U/L Final    ALT (SGPT) 07/17/2018 32  5 - 40 U/L Final    Alk. phosphatase 07/17/2018 80  25 - 115 U/L Final    Bilirubin, total 07/17/2018 0.6  0.2 - 1.2 mg/dL Final    Calcium 07/17/2018 9.7  8.4 - 10.4 mg/dL Final    Protein, total 07/17/2018 7.0  6.4 - 8.3 g/dL Final    Albumin 07/17/2018 4.9  3.5 - 5.0 g/dL Final    A-G Ratio 07/17/2018 2.3  1.1 - 2.6 ratio Final    Globulin 07/17/2018 2.1  2.0 - 4.0 g/dL Final    Anion gap 07/17/2018 19.0  mmol/L Final    Comment: Test includes Albumin, Alkaline Phosphatase, ALT, AST, BUN, Calcium, CO2,  Chloride, Creatinine, Glucose, Potassium, Sodium, Total Bilirubin and Total  Protein. Estimated GFR results are reported in mL/min/1.73 sq.m. by the MDRD equation. This eGFR is validated for stable chronic renal failure patients. This   equation  is unreliable in acute illness or patients with normal renal function.  GFRAA 07/17/2018 >60.0  >60.0 Final    GFRNA 07/17/2018 >60.0  >60.0 Final    Vitamin B12 07/17/2018 390  211 - 911 pg/mL Final    VITAMIN D, 25-HYDROXY 07/17/2018 20.4* 32.0 - 100.0 ng/mL Final          Follow-up Disposition:  Return if symptoms worsen or fail to improve.

## 2018-08-23 NOTE — MR AVS SNAPSHOT
[address] [address] 
[phone] Patient: Luis Quiroz. MRN: [id number] :1975 Visit Information Date & Time Provider Department Dept. Phone Encounter #  
 2018  9:00 AM Wil Holley MD [address] [facility] [phone] [id number] Follow-up Instructions Return if symptoms worsen or fail to improve. Follow-up and Disposition History Upcoming Health Maintenance Date Due DTaP/Tdap/Td series (1 - Tdap) 1998 Influenza Age 5 to Adult 2018 Allergies as of 2018  Review Complete On: 2018 By: Wil Holley MD  
  
 Severity Noted Reaction Type Reactions Penicillin  2015    Itching Penicillin G  2017    Other (comments) Current Immunizations  Never Reviewed No immunizations on file. Not reviewed this visit You Were Diagnosed With   
  
 Codes Comments Tremor, essential    -  Primary ICD-10-CM: G25.0 ICD-9-CM: 333.1 Vitamin D deficiency     ICD-10-CM: E55.9 ICD-9-CM: 268.9 Elevated blood pressure reading     ICD-10-CM: R03.0 ICD-9-CM: 796.2 Vitals BP Pulse Temp Resp Height(growth percentile) Weight(growth percentile) 130/90 (BP 1 Location: Right arm, BP Patient Position: Sitting) 60 97.3 °F (36.3 °C) (Oral) 16 5' 7\" (1.702 m) 182 lb (82.6 kg) SpO2 BMI Smoking Status 100% 28.51 kg/m2 Former Smoker Vitals History BMI and BSA Data Body Mass Index Body Surface Area 28.51 kg/m 2 1.98 m 2 Preferred Pharmacy Pharmacy Name Phone [phone] AT [address] [phone] Your Updated Medication List  
  
   
This list is accurate as of 18 11:59 PM.  Always use your most recent med list.  
  
  
  
  
 ergocalciferol 50,000 unit capsule Commonly known as:  VITAMIN D2 Take 1 Cap by mouth every seven (7) days for 90 days. propranolol 20 mg tablet Commonly known as:  INDERAL Take 1 Tab by mouth two (2) times a day for 60 days. Follow-up Instructions Return if symptoms worsen or fail to improve. Introducing [facility]! Dear Cheri Childers: Thank you for requesting a [employer] account. Our records indicate that you already have an active [employer] account. You can access your account anytime at [email or web address] Did you know that you can access your hospital and ER discharge instructions at any time in [employer]? You can also review all of your test results from your hospital stay or ER visit. Additional Information If you have questions, please visit the Frequently Asked Questions section of the [employer] website at [email or web address] Remember, [employer] is NOT to be used for urgent needs. For medical emergencies, dial 911. Now available from your iPhone and Android! Please provide this summary of care documentation to your next provider. Your primary care clinician is listed as Brandie Catherine. If you have any questions after today's visit, please call [phone].

## 2018-10-25 DIAGNOSIS — G25.0 TREMOR, ESSENTIAL: ICD-10-CM

## 2018-10-25 DIAGNOSIS — R03.0 ELEVATED BLOOD PRESSURE READING: ICD-10-CM

## 2018-10-25 NOTE — TELEPHONE ENCOUNTER
Requested Prescriptions     Pending Prescriptions Disp Refills    propranolol (INDERAL) 20 mg tablet 60 Tab 1     Sig: Take 1 Tab by mouth two (2) times a day for 60 days. [address] [place] [address] [phone]    They have 2 weeks worth of tablets remaining. Pts last appt was 8/23/18, No future appointment is scheduled. Pt aware of 72 hours time frame.

## 2018-10-29 NOTE — TELEPHONE ENCOUNTER
Dr. Laly Krishnan, please see refill request for patient, thank you! Requested Prescriptions     Pending Prescriptions Disp Refills    propranolol (INDERAL) 20 mg tablet 180 Tab 0     Sig: Take 1 Tab by mouth two (2) times a day.

## 2018-10-30 RX ORDER — PROPRANOLOL HYDROCHLORIDE 20 MG/1
20 TABLET ORAL 2 TIMES DAILY
Qty: 180 TAB | Refills: 0 | Status: SHIPPED | OUTPATIENT
Start: 2018-10-30 | End: 2018-11-29 | Stop reason: SDUPTHER

## 2018-11-29 ENCOUNTER — HOSPITAL ENCOUNTER (OUTPATIENT)
Dept: LAB | Age: 43
Discharge: HOME | End: 2018-11-29
Payer: COMMERCIAL

## 2018-11-29 ENCOUNTER — OFFICE VISIT (OUTPATIENT)
Dept: FAMILY MEDICINE CLINIC | Age: 43
End: 2018-11-29

## 2018-11-29 VITALS
RESPIRATION RATE: 17 BRPM | HEART RATE: 70 BPM | OXYGEN SATURATION: 98 % | BODY MASS INDEX: 31.08 KG/M2 | SYSTOLIC BLOOD PRESSURE: 147 MMHG | DIASTOLIC BLOOD PRESSURE: 93 MMHG | TEMPERATURE: 98.1 F | WEIGHT: 198 LBS | HEIGHT: 67 IN

## 2018-11-29 DIAGNOSIS — G25.0 TREMOR, ESSENTIAL: ICD-10-CM

## 2018-11-29 DIAGNOSIS — Z20.2 EXPOSURE TO SEXUALLY TRANSMITTED DISEASE (STD): ICD-10-CM

## 2018-11-29 DIAGNOSIS — Z20.2 EXPOSURE TO SEXUALLY TRANSMITTED DISEASE (STD): Primary | ICD-10-CM

## 2018-11-29 DIAGNOSIS — R03.0 ELEVATED BLOOD PRESSURE READING: ICD-10-CM

## 2018-11-29 DIAGNOSIS — G57.01 PIRIFORMIS SYNDROME, RIGHT: ICD-10-CM

## 2018-11-29 LAB — T PALLIDUM AB SER QL IA: NONREACTIVE

## 2018-11-29 PROCEDURE — 87491 CHLMYD TRACH DNA AMP PROBE: CPT

## 2018-11-29 PROCEDURE — 86803 HEPATITIS C AB TEST: CPT

## 2018-11-29 PROCEDURE — 87389 HIV-1 AG W/HIV-1&-2 AB AG IA: CPT

## 2018-11-29 PROCEDURE — 87340 HEPATITIS B SURFACE AG IA: CPT

## 2018-11-29 PROCEDURE — 36415 COLL VENOUS BLD VENIPUNCTURE: CPT

## 2018-11-29 PROCEDURE — 86780 TREPONEMA PALLIDUM: CPT

## 2018-11-29 RX ORDER — PROPRANOLOL HYDROCHLORIDE 20 MG/1
20 TABLET ORAL 2 TIMES DAILY
Qty: 180 TAB | Refills: 1 | Status: SHIPPED | OUTPATIENT
Start: 2018-11-29 | End: 2019-09-20 | Stop reason: SDUPTHER

## 2018-11-29 NOTE — PROGRESS NOTES
Havery Ralphs. Chief Complaint   Patient presents with    Exposure to STD     Vitals:    11/29/18 0858   BP: (!) 147/93   Pulse: 70   Resp: 17   Temp: 98.1 °F (36.7 °C)   TempSrc: Oral   SpO2: 98%   Weight: 198 lb (89.8 kg)   Height: 5' 7\" (1.702 m)   PainSc:   0 - No pain         HPI: Patient is here to be tested for STD for possible exposure, he is asymptomatic no discharge no joint pain no skin rash no itching. He has elevated blood pressure he has been taking propranolol to help with the tremor as well as elevated blood pressure blood pressure today is elevated , patient may be does have hypertension. He has a history of Accident 12 years ago and he sustained right gluteal muscle injury and he has chronic piriformis syndrome, I advised him he can try physical therapy but he he will try to do regular stretching. Instruction were explained to the patient and he understood and verbalized understanding and advised him that he need to stretch twice daily. Past Medical History:   Diagnosis Date    Prehypertension      Past Surgical History:   Procedure Laterality Date    HX WISDOM TEETH EXTRACTION       Social History     Tobacco Use    Smoking status: Former Smoker    Smokeless tobacco: Current User   Substance Use Topics    Alcohol use: Yes     Comment: very occassional       Family History   Problem Relation Age of Onset    Thyroid Disease Mother        Review of Systems   Constitutional: Negative for chills, fever, malaise/fatigue and weight loss. HENT: Negative for congestion, ear discharge, ear pain, hearing loss, nosebleeds, sinus pain and sore throat. Eyes: Negative for blurred vision, double vision and discharge. Respiratory: Negative for cough, hemoptysis, sputum production, shortness of breath and wheezing. Cardiovascular: Negative for chest pain, palpitations, claudication and leg swelling.    Gastrointestinal: Negative for abdominal pain, constipation, diarrhea, nausea and vomiting. Genitourinary: Negative for dysuria, frequency and urgency. Musculoskeletal: Positive for myalgias. Negative for back pain, falls, joint pain and neck pain. Skin: Negative for itching and rash. Neurological: Positive for tremors. Negative for dizziness, tingling, sensory change, speech change, focal weakness, weakness and headaches. Psychiatric/Behavioral: Negative for depression, hallucinations, substance abuse and suicidal ideas. The patient is not nervous/anxious and does not have insomnia. Physical Exam   Constitutional: He is oriented to person, place, and time. He appears well-developed and well-nourished. No distress. HENT:   Head: Normocephalic and atraumatic. Mouth/Throat: No oropharyngeal exudate. Eyes: Conjunctivae are normal. Pupils are equal, round, and reactive to light. Right eye exhibits no discharge. Left eye exhibits no discharge. No scleral icterus. Neck: Normal range of motion. Neck supple. No thyromegaly present. Cardiovascular: Normal rate, regular rhythm and normal heart sounds. Pulmonary/Chest: Effort normal and breath sounds normal. No respiratory distress. He has no rales. Abdominal: Soft. Bowel sounds are normal. He exhibits no distension and no mass. There is no tenderness. There is no rebound. Musculoskeletal: Normal range of motion. He exhibits no edema, tenderness or deformity. Lymphadenopathy:     He has no cervical adenopathy. Neurological: He is alert and oriented to person, place, and time. No cranial nerve deficit. Coordination normal.   Skin: Skin is warm and dry. No rash noted. He is not diaphoretic. No erythema. Psychiatric: He has a normal mood and affect. Judgment and thought content normal.   Nursing note and vitals reviewed. Assessment and plan     1.  Elevated blood pressure reading    It has been a sign my chart flow chart for blood pressure patient will record the blood pressure readings on the floor chart.  - propranolol (INDERAL) 20 mg tablet; Take 1 Tab by mouth two (2) times a day. Dispense: 180 Tab; Refill: 1    2. Tremor, essential    - propranolol (INDERAL) 20 mg tablet; Take 1 Tab by mouth two (2) times a day. Dispense: 180 Tab; Refill: 1    3. Exposure to sexually transmitted disease (STD)    - T PALLIDUM AB; Future  - HEP B SURFACE AG; Future  - HEPATITIS C AB; Future  - HIV 1/2 AG/AB, 4TH GENERATION,W RFLX CONFIRM; Future  - CHLAMYDIA/GC PCR  - CHLAMYDIA/NEISSERIA AMPLIFICATION; Future    Current Outpatient Medications   Medication Sig Dispense Refill    propranolol (INDERAL) 20 mg tablet Take 1 Tab by mouth two (2) times a day. 180 Tab 1       Patient Active Problem List    Diagnosis Date Noted    Piriformis syndrome, right 11/29/2018    Vitamin D deficiency 07/24/2018    Overweight (BMI 25.0-29.9) 07/24/2018    Tremor, essential 07/24/2018    Excessive sweating 07/24/2018    Elevated blood pressure reading 07/24/2018     Results for orders placed or performed in visit on 07/17/18   CBC WITH AUTOMATED DIFF   Result Value Ref Range    WBC 7.0 4.0 - 11.0 K/uL    RBC 4.84 3.80 - 5.80 M/uL    HGB 14.7 13.2 - 17.3 g/dL    HCT 44.7 36.6 - 51.9 %    MCV 92 80 - 95 fL    MCH 30 26 - 34 pg    MCHC 33 31 - 36 g/dL    RDW 13.4 10.0 - 15.5 %    PLATELET 723 409 - 445 K/uL    MPV 10.2 9.0 - 13.0 fL    NEUTROPHILS 62 40 - 75 %    Lymphocytes 26 20 - 45 %    MONOCYTES 10 3 - 12 %    EOSINOPHILS 2 0 - 6 %    BASOPHILS 1 0 - 2 %    ABS. NEUTROPHILS 4.3 1.8 - 7.7 K/uL    ABSOLUTE LYMPHOCYTE COUNT 1.8 1.0 - 4.8 K/uL    ABS. MONOCYTES 0.7 0.1 - 1.0 K/uL    ABS. EOSINOPHILS 0.1 0.0 - 0.5 K/uL    ABS.  BASOPHILS 0.0 0.0 - 0.2 K/uL   TSH 3RD GENERATION   Result Value Ref Range    TSH 1.72 0.27 - 4.20 mcU/mL   LIPID PANEL   Result Value Ref Range    Triglyceride 115 40 - 149 mg/dL    HDL Cholesterol 47 40 - 59 mg/dL    Cholesterol, total 201 (H) 110 - 200 mg/dL    CHOLESTEROL/HDL 4.3 0.0 - 5.0    LDL, calculated 131 (H) 50 - 99 mg/dL    VLDL, calculated 23 8 - 30 mg/dL   METABOLIC PANEL, COMPREHENSIVE   Result Value Ref Range    Glucose 91 70 - 99 mg/dL    BUN 12 6 - 22 mg/dL    Creatinine 0.9 0.5 - 1.2 mg/dL    Sodium 142 133 - 145 mmol/L    Potassium 4.6 3.5 - 5.5 mmol/L    Chloride 99 98 - 110 mmol/L    CO2 24 20 - 32 mmol/L    AST (SGOT) 20 10 - 37 U/L    ALT (SGPT) 32 5 - 40 U/L    Alk. phosphatase 80 25 - 115 U/L    Bilirubin, total 0.6 0.2 - 1.2 mg/dL    Calcium 9.7 8.4 - 10.4 mg/dL    Protein, total 7.0 6.4 - 8.3 g/dL    Albumin 4.9 3.5 - 5.0 g/dL    A-G Ratio 2.3 1.1 - 2.6 ratio    Globulin 2.1 2.0 - 4.0 g/dL    Anion gap 19.0 mmol/L    GFRAA >60.0 >60.0    GFRNA >60.0 >60.0   VITAMIN B12   Result Value Ref Range    Vitamin B12 390 211 - 911 pg/mL   VITAMIN D, 25 HYDROXY   Result Value Ref Range    VITAMIN D, 25-HYDROXY 20.4 (L) 32.0 - 100.0 ng/mL     No visits with results within 3 Month(s) from this visit. Latest known visit with results is:   Office Visit on 07/17/2018   Component Date Value Ref Range Status    WBC 07/17/2018 7.0  4.0 - 11.0 K/uL Final    RBC 07/17/2018 4.84  3.80 - 5.80 M/uL Final    HGB 07/17/2018 14.7  13.2 - 17.3 g/dL Final    HCT 07/17/2018 44.7  36.6 - 51.9 % Final    MCV 07/17/2018 92  80 - 95 fL Final    MCH 07/17/2018 30  26 - 34 pg Final    MCHC 07/17/2018 33  31 - 36 g/dL Final    RDW 07/17/2018 13.4  10.0 - 15.5 % Final    PLATELET 08/07/7408 022  140 - 440 K/uL Final    MPV 07/17/2018 10.2  9.0 - 13.0 fL Final    NEUTROPHILS 07/17/2018 62  40 - 75 % Final    Lymphocytes 07/17/2018 26  20 - 45 % Final    MONOCYTES 07/17/2018 10  3 - 12 % Final    EOSINOPHILS 07/17/2018 2  0 - 6 % Final    BASOPHILS 07/17/2018 1  0 - 2 % Final    ABS. NEUTROPHILS 07/17/2018 4.3  1.8 - 7.7 K/uL Final    ABSOLUTE LYMPHOCYTE COUNT 07/17/2018 1.8  1.0 - 4.8 K/uL Final    ABS. MONOCYTES 07/17/2018 0.7  0.1 - 1.0 K/uL Final    ABS. EOSINOPHILS 07/17/2018 0.1  0.0 - 0.5 K/uL Final    ABS. BASOPHILS 07/17/2018 0.0  0.0 - 0.2 K/uL Final    TSH 07/17/2018 1.72  0.27 - 4.20 mcU/mL Final    Triglyceride 07/17/2018 115  40 - 149 mg/dL Final    HDL Cholesterol 07/17/2018 47  40 - 59 mg/dL Final    Cholesterol, total 07/17/2018 201* 110 - 200 mg/dL Final    CHOLESTEROL/HDL 07/17/2018 4.3  0.0 - 5.0 Final    LDL, calculated 07/17/2018 131* 50 - 99 mg/dL Final    VLDL, calculated 07/17/2018 23  8 - 30 mg/dL Final    Comment: Test includes cholesterol, HDL cholesterol, triglycerides and LDL. Cholesterol Recommended NCEP guidelines in mg/dL:  Less than 200            Desirable  200 - 239                Borderline High  Greater than or  = 240   High  Please Note:  Total Chol/HDL Ratio                   Men     Women  1/2 Avg. Risk    3.4     3.3      Avg. Risk    5.0     4.4  2X  Avg. Risk    9.6     7.1  3X  Avg. Risk   23.4    11.0      Glucose 07/17/2018 91  70 - 99 mg/dL Final    BUN 07/17/2018 12  6 - 22 mg/dL Final    Creatinine 07/17/2018 0.9  0.5 - 1.2 mg/dL Final    Sodium 07/17/2018 142  133 - 145 mmol/L Final    Potassium 07/17/2018 4.6  3.5 - 5.5 mmol/L Final    Chloride 07/17/2018 99  98 - 110 mmol/L Final    CO2 07/17/2018 24  20 - 32 mmol/L Final    AST (SGOT) 07/17/2018 20  10 - 37 U/L Final    ALT (SGPT) 07/17/2018 32  5 - 40 U/L Final    Alk. phosphatase 07/17/2018 80  25 - 115 U/L Final    Bilirubin, total 07/17/2018 0.6  0.2 - 1.2 mg/dL Final    Calcium 07/17/2018 9.7  8.4 - 10.4 mg/dL Final    Protein, total 07/17/2018 7.0  6.4 - 8.3 g/dL Final    Albumin 07/17/2018 4.9  3.5 - 5.0 g/dL Final    A-G Ratio 07/17/2018 2.3  1.1 - 2.6 ratio Final    Globulin 07/17/2018 2.1  2.0 - 4.0 g/dL Final    Anion gap 07/17/2018 19.0  mmol/L Final    Comment: Test includes Albumin, Alkaline Phosphatase, ALT, AST, BUN, Calcium, CO2,  Chloride, Creatinine, Glucose, Potassium, Sodium, Total Bilirubin and Total  Protein.   Estimated GFR results are reported in mL/min/1.73 sq.m. by the MDRD equation. This eGFR is validated for stable chronic renal failure patients. This   equation  is unreliable in acute illness or patients with normal renal function.  GFRAA 07/17/2018 >60.0  >60.0 Final    GFRNA 07/17/2018 >60.0  >60.0 Final    Vitamin B12 07/17/2018 390  211 - 911 pg/mL Final    VITAMIN D, 25-HYDROXY 07/17/2018 20.4* 32.0 - 100.0 ng/mL Final          Follow-up Disposition:  Return in about 2 weeks (around 12/13/2018) for for BP check .

## 2018-11-29 NOTE — PROGRESS NOTES
Aaron Wen. is a 41 y.o. male presents to office for std check    Medication list has been reviewed with Aaron Azar and updated as of today's date     Health Maintenance items with a due date reviewed with patient:  Health Maintenance Due   Topic Date Due    DTaP/Tdap/Td series (1 - Tdap) 04/28/1998

## 2018-11-30 LAB
HBV SURFACE AG SER QL: <0.1 INDEX
HBV SURFACE AG SER QL: NEGATIVE
HCV AB SER IA-ACNC: 0.07 INDEX
HCV AB SERPL QL IA: NEGATIVE
HCV COMMENT,HCGAC: NORMAL
HIV 1+2 AB+HIV1 P24 AG SERPL QL IA: NONREACTIVE
HIV12 RESULT COMMENT, HHIVC: NORMAL

## 2018-12-02 LAB
C TRACH RRNA SPEC QL NAA+PROBE: NEGATIVE
N GONORRHOEA RRNA SPEC QL NAA+PROBE: NEGATIVE
SPECIMEN SOURCE: NORMAL

## 2019-07-14 ENCOUNTER — TELEPHONE (OUTPATIENT)
Dept: FAMILY MEDICINE CLINIC | Age: 44
End: 2019-07-14

## 2019-09-20 DIAGNOSIS — G25.0 TREMOR, ESSENTIAL: ICD-10-CM

## 2019-09-20 DIAGNOSIS — R03.0 ELEVATED BLOOD PRESSURE READING: ICD-10-CM

## 2019-09-24 RX ORDER — PROPRANOLOL HYDROCHLORIDE 20 MG/1
20 TABLET ORAL 2 TIMES DAILY
Qty: 60 TAB | Refills: 0 | Status: SHIPPED | OUTPATIENT
Start: 2019-09-24 | End: 2019-11-06 | Stop reason: SDUPTHER

## 2019-09-24 NOTE — TELEPHONE ENCOUNTER
Please schedule for follow-up and ?or a physical if he is eligible    Need to be seen before any further refill

## 2019-11-06 DIAGNOSIS — G25.0 TREMOR, ESSENTIAL: ICD-10-CM

## 2019-11-06 DIAGNOSIS — R03.0 ELEVATED BLOOD PRESSURE READING: ICD-10-CM

## 2019-11-08 RX ORDER — PROPRANOLOL HYDROCHLORIDE 20 MG/1
TABLET ORAL
Qty: 60 TAB | Refills: 0 | Status: SHIPPED | OUTPATIENT
Start: 2019-11-08 | End: 2019-12-11 | Stop reason: SDUPTHER

## 2019-12-11 DIAGNOSIS — R03.0 ELEVATED BLOOD PRESSURE READING: ICD-10-CM

## 2019-12-11 DIAGNOSIS — G25.0 TREMOR, ESSENTIAL: ICD-10-CM

## 2019-12-11 RX ORDER — PROPRANOLOL HYDROCHLORIDE 20 MG/1
TABLET ORAL
Qty: 60 TAB | Refills: 0 | Status: SHIPPED | OUTPATIENT
Start: 2019-12-11 | End: 2020-02-04

## 2020-02-04 DIAGNOSIS — R03.0 ELEVATED BLOOD PRESSURE READING: ICD-10-CM

## 2020-02-04 DIAGNOSIS — G25.0 TREMOR, ESSENTIAL: ICD-10-CM

## 2020-02-04 RX ORDER — PROPRANOLOL HYDROCHLORIDE 20 MG/1
TABLET ORAL
Qty: 60 TAB | Refills: 0 | Status: SHIPPED | OUTPATIENT
Start: 2020-02-04 | End: 2020-02-17 | Stop reason: SDUPTHER

## 2020-02-10 DIAGNOSIS — R03.0 ELEVATED BLOOD PRESSURE READING: ICD-10-CM

## 2020-02-10 DIAGNOSIS — G25.0 TREMOR, ESSENTIAL: ICD-10-CM

## 2020-02-10 NOTE — TELEPHONE ENCOUNTER
Advised the pharmacy that the patient needs appt and for them to reach out incase they are able to get a hold of patient as I have been unsuccessful.

## 2020-02-10 NOTE — TELEPHONE ENCOUNTER
[facility] pharmacy is calling because pt insurance will not cover a 30 days supply of the following medication: propranolol (INDERAL) 20mg tablet. Pharmacy is requesting a 90day supply for the pt, was told pt just received a 60tab supply on 2/4/20.     [facility]   Phone: [phone]

## 2020-02-12 RX ORDER — PROPRANOLOL HYDROCHLORIDE 20 MG/1
TABLET ORAL
Qty: 180 TAB | Refills: 0 | OUTPATIENT
Start: 2020-02-12

## 2020-02-17 ENCOUNTER — OFFICE VISIT (OUTPATIENT)
Dept: FAMILY MEDICINE CLINIC | Age: 45
End: 2020-02-17

## 2020-02-17 VITALS
BODY MASS INDEX: 31.27 KG/M2 | HEART RATE: 70 BPM | HEIGHT: 67 IN | RESPIRATION RATE: 16 BRPM | DIASTOLIC BLOOD PRESSURE: 112 MMHG | SYSTOLIC BLOOD PRESSURE: 160 MMHG | TEMPERATURE: 98.4 F | OXYGEN SATURATION: 100 % | WEIGHT: 199.2 LBS

## 2020-02-17 DIAGNOSIS — I10 ESSENTIAL HYPERTENSION: Primary | ICD-10-CM

## 2020-02-17 DIAGNOSIS — E55.9 VITAMIN D DEFICIENCY: ICD-10-CM

## 2020-02-17 DIAGNOSIS — E78.2 MIXED HYPERLIPIDEMIA: ICD-10-CM

## 2020-02-17 DIAGNOSIS — G25.0 TREMOR, ESSENTIAL: ICD-10-CM

## 2020-02-17 RX ORDER — AMLODIPINE BESYLATE 10 MG/1
10 TABLET ORAL DAILY
Qty: 90 TAB | Refills: 0 | Status: SHIPPED | OUTPATIENT
Start: 2020-02-17 | End: 2022-06-09

## 2020-02-17 RX ORDER — PROPRANOLOL HYDROCHLORIDE 20 MG/1
TABLET ORAL
Qty: 180 TAB | Refills: 1 | Status: SHIPPED | OUTPATIENT
Start: 2020-02-17 | End: 2020-09-25

## 2020-02-17 NOTE — PROGRESS NOTES
Author . presents today for   Chief Complaint   Patient presents with    Elevated Blood Pressure    Tremors       Is someone accompanying this pt? no    Is the patient using any DME equipment during OV? no    Depression Screening:  3 most recent PHQ Screens 2/17/2020   Little interest or pleasure in doing things Not at all   Feeling down, depressed, irritable, or hopeless Not at all   Total Score PHQ 2 0       Learning Assessment:  Learning Assessment 2/17/2020   PRIMARY LEARNER Patient   BARRIERS PRIMARY LEARNER -   PRIMARY LANGUAGE ENGLISH   LEARNER PREFERENCE PRIMARY DEMONSTRATION     READING     VIDEOS     LISTENING     PICTURES   ANSWERED BY patient   RELATIONSHIP SELF       Abuse Screening:  Abuse Screening Questionnaire 7/17/2018   Do you ever feel afraid of your partner? N   Are you in a relationship with someone who physically or mentally threatens you? N   Is it safe for you to go home? Y       Fall Risk  Fall Risk Assessment, last 12 mths 7/17/2018   Able to walk? Yes   Fall in past 12 months? No       Health Maintenance reviewed and discussed and ordered per Provider. Health Maintenance Due   Topic Date Due    DTaP/Tdap/Td series (1 - Tdap) 04/28/1988    Influenza Age 5 to Adult  08/01/2019       Pt currently taking Antiplatelet therapy? no    Coordination of Care:  1. Have you been to the ER, urgent care clinic since your last visit? Hospitalized since your last visit? no    2. Have you seen or consulted any other health care providers outside of the [address] [place] [address] since your last visit? Include any pap smears or colon screening.  no

## 2020-02-17 NOTE — PROGRESS NOTES
Deadra Bence. Chief Complaint   Patient presents with    Elevated Blood Pressure    Tremors     Vitals:    02/17/20 1515   BP: (!) 160/112   Pulse: 70   Resp: 16   Temp: 98.4 °F (36.9 °C)   TempSrc: Oral   SpO2: 100%   Weight: 199 lb 3.2 oz (90.4 kg)   Height: 5' 7\" (1.702 m)   PainSc:   0 - No pain         HPI: Mr. Wendi Terry is here for follow-up, patient last seen was in November 29, 2018, patient has been called multiple times for follow-up and to get annual physical, patient seems irritated today and upset and he he think he does not need to follow-up regular A1c should be enough!!! Last visit in November    Patient blood pressure has been borderline and we talked about he monitor his blood pressure at home and follow-up for further evaluation, patient never followed up as planned. Blood pressure is 160/112, no blurred vision no chest pain, patient said he had a dull headache. No shortness of breath no dizziness. I have discussed with him that blood pressure is elevated now this is needed diagnosis of hypertension need to start him on medication. Patient stating that his blood pressure start going higher after he start propranolol, I have explained to the patient that is not a common side effects of propranolol to elevated blood pressure, but he can try stopping it and reevaluate in 1 week, patient said he cannot come back because of work issues. Patient also due for blood work which will be ordered today lab is closed for the day, patient can get it when he comes back or  he can get it at an outside lab where is  covered by his insurance.       Patient was frustrated that he could not get his blood work done today, and he does not want to start a new medication for blood pressure today he will just stop propranolol and see how blood pressure reading will come down ,he will started on amlodipine 10 mg daily and advised him to go to emergency room if he develop any dizziness any blurred vision any chest pain or severe headaches. Patient and her understand the risks of not treating his blood pressure and include but not limited to stroke heart attack and possible bleeding in the brain       Patient stated that he will prefer not to follow-up here and he will look for a new PCP            Past Medical History:   Diagnosis Date    Prehypertension      Past Surgical History:   Procedure Laterality Date    HX WISDOM TEETH EXTRACTION       Social History     Tobacco Use    Smoking status: Former Smoker    Smokeless tobacco: Current User    Tobacco comment: patient vapes   Substance Use Topics    Alcohol use: Yes     Comment: very occassional       Family History   Problem Relation Age of Onset    Thyroid Disease Mother        Review of Systems   Constitutional: Negative for chills, fever, malaise/fatigue and weight loss. HENT: Negative for congestion, ear discharge, ear pain, hearing loss, nosebleeds and sore throat. Eyes: Negative for blurred vision, double vision and discharge. Respiratory: Negative for cough, hemoptysis, sputum production, shortness of breath and wheezing. Cardiovascular: Negative for chest pain, palpitations, claudication and leg swelling. Gastrointestinal: Negative for abdominal pain, constipation, diarrhea, nausea and vomiting. Genitourinary: Negative for dysuria, frequency and urgency. Musculoskeletal: Negative for back pain, joint pain, myalgias and neck pain. Skin: Negative for itching and rash. Neurological: Positive for headaches. Negative for dizziness, tingling, sensory change, speech change, focal weakness and weakness. Psychiatric/Behavioral: Negative for depression and suicidal ideas. Physical Exam  Constitutional:       General: He is not in acute distress. Appearance: He is well-developed. He is not diaphoretic. HENT:      Head: Normocephalic and atraumatic. Eyes:      General:         Right eye: No discharge.    Neck: Musculoskeletal: Normal range of motion and neck supple. Thyroid: No thyromegaly. Pulmonary:      Effort: Pulmonary effort is normal.   Musculoskeletal: Normal range of motion. General: No deformity. Lymphadenopathy:      Cervical: No cervical adenopathy. Skin:     General: Skin is warm and dry. Neurological:      Mental Status: He is alert and oriented to person, place, and time. Psychiatric:         Thought Content: Thought content normal.         Judgment: Judgment normal.          Assessment and plan     Plan of care has been discussed with the patient, he agrees to the plan and verbalized understanding. All his questions were answered  More than 50% of the time spent in this visit was counseling the patient about  illness and treatment options         1. Tremor, essential    - propranolol (INDERAL) 20 mg tablet; TAKE 1 TABLET BY MOUTH TWICE DAILY  Dispense: 180 Tab; Refill: 1    2. Vitamin D deficiency      3. Essential hypertension    - amLODIPine (NORVASC) 10 mg tablet; Take 1 Tab by mouth daily. Dispense: 90 Tab; Refill: 0  - METABOLIC PANEL, COMPREHENSIVE; Future    4. Mixed hyperlipidemia    - LIPID PANEL; Future    Current Outpatient Medications   Medication Sig Dispense Refill    propranolol (INDERAL) 20 mg tablet TAKE 1 TABLET BY MOUTH TWICE DAILY 180 Tab 1    amLODIPine (NORVASC) 10 mg tablet Take 1 Tab by mouth daily. 90 Tab 0       Patient Active Problem List    Diagnosis Date Noted    Piriformis syndrome, right 11/29/2018    Vitamin D deficiency 07/24/2018    Overweight (BMI 25.0-29.9) 07/24/2018    Tremor, essential 07/24/2018    Excessive sweating 07/24/2018    Elevated blood pressure reading 07/24/2018     Results for orders placed or performed during the hospital encounter of 11/29/18   T PALLIDUM AB   Result Value Ref Range    T. pallidum interpretation.  NONREACTIVE NR     HEP B SURFACE AG   Result Value Ref Range    Hepatitis B surface Ag <0.10 <1.00 Index Hep B surface Ag Interp. NEGATIVE  NEG     HEPATITIS C AB   Result Value Ref Range    Hepatitis C virus Ab 0.07 <0.80 Index    Hep C  virus Ab Interp. NEGATIVE  NEG      Hep C  virus Ab comment         HIV 1/2 AG/AB, 4TH GENERATION,W RFLX CONFIRM   Result Value Ref Range    HIV 1/2 Interpretation NONREACTIVE NR      HIV 1/2 result comment SEE NOTE     CHLAMYDIA / GC-AMPLIFIED   Result Value Ref Range    Source URINE      Chlamydia trachomatis, RON NEGATIVE  NEGATIVE      Neisseria gonorrhoeae, RON NEGATIVE  NEGATIVE       No visits with results within 3 Month(s) from this visit. Latest known visit with results is:   Hospital Outpatient Visit on 11/29/2018   Component Date Value Ref Range Status    T. pallidum interpretation. 11/29/2018 NONREACTIVE  NR   Final    Comment: (NOTE)  A nonreactive test result does not exclude the possibility of   exposure to or infection with syphilis. T. pallidum antibodies may be   undetectable in some stages of the infection and in some clinical   conditions.  Hepatitis B surface Ag 11/29/2018 <0.10  <1.00 Index Final    Hep B surface Ag Interp. 11/29/2018 NEGATIVE   NEG   Final    Hepatitis C virus Ab 11/29/2018 0.07  <0.80 Index Final    Hep C  virus Ab Interp. 11/29/2018 NEGATIVE   NEG   Final    Hep C  virus Ab comment 11/29/2018        Final    Comment: Index <0.80. ......................... Katherene Means Negative  Index > or = to 0.80 and <1.00. .... Katherene Means Katherene Means Equivocal  Index >1.00. ......................... Katherene Means Positive          For Equivocal or Positive results, confirmation with Hepatitis C RNA by PCR or bDNA is suggested.  HIV 1/2 Interpretation 11/29/2018 NONREACTIVE  NR   Final    HIV 1/2 result comment 11/29/2018 SEE NOTE    Final    Comment: While this assay is highly sensitive, a non-reactive/negative result for HIV antibodies HIV-1 and HIV-2 and p24 antigen, does not exclude the possibility of exposure to or infection with HIV.   HIV antibodies and/or p24 antigen may be undetectable in some stages of the infection and in some clinical conditions. Test performed by the [employer] HIV Ag/Ab Combo (CHIV), 4th generation assay. Recommend consulting relevant CDC guidelines for informing test subject of the result and its interpretation.       Source 11/29/2018 URINE    Final    Chlamydia trachomatis, RON 11/29/2018 NEGATIVE   NEGATIVE   Final    Neisseria gonorrhoeae, RON 11/29/2018 NEGATIVE   NEGATIVE   Final    Comment: (NOTE)  Performed At: [facility] [id number] [place] [address] [id number]  Jennifer Beck MD OY:[id number]

## 2020-02-21 ENCOUNTER — TELEPHONE (OUTPATIENT)
Dept: FAMILY MEDICINE CLINIC | Age: 45
End: 2020-02-21

## 2020-02-21 NOTE — TELEPHONE ENCOUNTER
Bradley has decided to self discharge  On his last visit on 2/17/20  please will send  him a discharge letter letter

## 2020-09-25 DIAGNOSIS — G25.0 TREMOR, ESSENTIAL: ICD-10-CM

## 2020-09-25 RX ORDER — PROPRANOLOL HYDROCHLORIDE 20 MG/1
TABLET ORAL
Qty: 180 TAB | Refills: 0 | Status: SHIPPED | OUTPATIENT
Start: 2020-09-25 | End: 2022-07-07 | Stop reason: SDUPTHER

## 2020-10-07 ENCOUNTER — TELEPHONE (OUTPATIENT)
Dept: FAMILY MEDICINE CLINIC | Age: 45
End: 2020-10-07

## 2022-03-18 PROBLEM — R03.0 ELEVATED BLOOD PRESSURE READING: Status: ACTIVE | Noted: 2018-07-24

## 2022-03-18 PROBLEM — E55.9 VITAMIN D DEFICIENCY: Status: ACTIVE | Noted: 2018-07-24

## 2022-03-19 PROBLEM — G25.0 TREMOR, ESSENTIAL: Status: ACTIVE | Noted: 2018-07-24

## 2022-03-19 PROBLEM — R61 EXCESSIVE SWEATING: Status: ACTIVE | Noted: 2018-07-24

## 2022-03-19 PROBLEM — G57.01 PIRIFORMIS SYNDROME, RIGHT: Status: ACTIVE | Noted: 2018-11-29

## 2022-03-20 PROBLEM — E66.3 OVERWEIGHT (BMI 25.0-29.9): Status: ACTIVE | Noted: 2018-07-24

## 2022-06-09 ENCOUNTER — OFFICE VISIT (OUTPATIENT)
Dept: INTERNAL MEDICINE CLINIC | Age: 47
End: 2022-06-09

## 2022-06-09 ENCOUNTER — HOSPITAL ENCOUNTER (OUTPATIENT)
Dept: LAB | Age: 47
Discharge: HOME | End: 2022-06-09
Payer: COMMERCIAL

## 2022-06-09 VITALS
TEMPERATURE: 97 F | DIASTOLIC BLOOD PRESSURE: 97 MMHG | SYSTOLIC BLOOD PRESSURE: 134 MMHG | HEIGHT: 67 IN | BODY MASS INDEX: 30.7 KG/M2 | WEIGHT: 195.6 LBS

## 2022-06-09 DIAGNOSIS — G25.0 TREMOR, ESSENTIAL: ICD-10-CM

## 2022-06-09 DIAGNOSIS — Z71.6 ENCOUNTER FOR TOBACCO USE CESSATION COUNSELING: ICD-10-CM

## 2022-06-09 DIAGNOSIS — Z13.0 SCREENING, ANEMIA, DEFICIENCY, IRON: ICD-10-CM

## 2022-06-09 DIAGNOSIS — Z72.0 VAPES NICOTINE CONTAINING SUBSTANCE: ICD-10-CM

## 2022-06-09 DIAGNOSIS — Z13.220 SCREENING, LIPID: ICD-10-CM

## 2022-06-09 DIAGNOSIS — I10 PRIMARY HYPERTENSION: ICD-10-CM

## 2022-06-09 DIAGNOSIS — Z76.89 ESTABLISHING CARE WITH NEW DOCTOR, ENCOUNTER FOR: ICD-10-CM

## 2022-06-09 DIAGNOSIS — I10 PRIMARY HYPERTENSION: Primary | ICD-10-CM

## 2022-06-09 LAB
ALBUMIN SERPL-MCNC: 4.5 G/DL (ref 3.4–5)
ALBUMIN/GLOB SERPL: 1.3 {RATIO} (ref 0.8–1.7)
ALP SERPL-CCNC: 81 U/L (ref 45–117)
ALT SERPL-CCNC: 65 U/L (ref 16–61)
ANION GAP SERPL CALC-SCNC: 4 MMOL/L (ref 3–18)
AST SERPL-CCNC: 29 U/L (ref 10–38)
BILIRUB SERPL-MCNC: 0.6 MG/DL (ref 0.2–1)
BUN SERPL-MCNC: 12 MG/DL (ref 7–18)
BUN/CREAT SERPL: 12 (ref 12–20)
CALCIUM SERPL-MCNC: 10 MG/DL (ref 8.5–10.1)
CHLORIDE SERPL-SCNC: 106 MMOL/L (ref 100–111)
CHOLEST SERPL-MCNC: 258 MG/DL
CO2 SERPL-SCNC: 31 MMOL/L (ref 21–32)
CREAT SERPL-MCNC: 1.03 MG/DL (ref 0.6–1.3)
CREAT UR-MCNC: 190 MG/DL (ref 30–125)
ERYTHROCYTE [DISTWIDTH] IN BLOOD BY AUTOMATED COUNT: 12.4 % (ref 11.6–14.5)
GLOBULIN SER CALC-MCNC: 3.5 G/DL (ref 2–4)
GLUCOSE SERPL-MCNC: 113 MG/DL (ref 74–99)
HCT VFR BLD AUTO: 48.2 % (ref 36–48)
HDLC SERPL-MCNC: 49 MG/DL (ref 40–60)
HDLC SERPL: 5.3 {RATIO} (ref 0–5)
HGB BLD-MCNC: 15.8 G/DL (ref 13–16)
LDLC SERPL CALC-MCNC: 141.8 MG/DL (ref 0–100)
LIPID PROFILE,FLP: ABNORMAL
MCH RBC QN AUTO: 29.7 PG (ref 24–34)
MCHC RBC AUTO-ENTMCNC: 32.8 G/DL (ref 31–37)
MCV RBC AUTO: 90.6 FL (ref 78–100)
MICROALBUMIN UR-MCNC: 1.36 MG/DL (ref 0–3)
MICROALBUMIN/CREAT UR-RTO: 7 MG/G (ref 0–30)
NRBC # BLD: 0 K/UL (ref 0–0.01)
NRBC BLD-RTO: 0 PER 100 WBC
PLATELET # BLD AUTO: 377 K/UL (ref 135–420)
PMV BLD AUTO: 10.4 FL (ref 9.2–11.8)
POTASSIUM SERPL-SCNC: 4.6 MMOL/L (ref 3.5–5.5)
PROT SERPL-MCNC: 8 G/DL (ref 6.4–8.2)
RBC # BLD AUTO: 5.32 M/UL (ref 4.35–5.65)
SODIUM SERPL-SCNC: 141 MMOL/L (ref 136–145)
TRIGL SERPL-MCNC: 336 MG/DL (ref ?–150)
VLDLC SERPL CALC-MCNC: 67.2 MG/DL
WBC # BLD AUTO: 9.3 K/UL (ref 4.6–13.2)

## 2022-06-09 PROCEDURE — 82043 UR ALBUMIN QUANTITATIVE: CPT

## 2022-06-09 PROCEDURE — 99406 BEHAV CHNG SMOKING 3-10 MIN: CPT | Performed by: STUDENT IN AN ORGANIZED HEALTH CARE EDUCATION/TRAINING PROGRAM

## 2022-06-09 PROCEDURE — 85027 COMPLETE CBC AUTOMATED: CPT

## 2022-06-09 PROCEDURE — 36415 COLL VENOUS BLD VENIPUNCTURE: CPT

## 2022-06-09 PROCEDURE — 80061 LIPID PANEL: CPT

## 2022-06-09 PROCEDURE — 99204 OFFICE O/P NEW MOD 45 MIN: CPT | Performed by: STUDENT IN AN ORGANIZED HEALTH CARE EDUCATION/TRAINING PROGRAM

## 2022-06-09 PROCEDURE — 80053 COMPREHEN METABOLIC PANEL: CPT

## 2022-06-09 RX ORDER — LISINOPRIL 20 MG/1
TABLET ORAL
COMMUNITY
End: 2022-06-09

## 2022-06-09 RX ORDER — LISINOPRIL 40 MG/1
40 TABLET ORAL DAILY
Qty: 90 TABLET | Refills: 0 | Status: SHIPPED | OUTPATIENT
Start: 2022-06-09 | End: 2022-07-07 | Stop reason: SDUPTHER

## 2022-06-09 NOTE — PROGRESS NOTES
HISTORY OF PRESENT ILLNESS  Elham Aburto. is a 41 y.o. male. New pt here to [facility]      HTN- Previous provider rx lisinopril 2yrs ago. Hasnt been checking at home, when he does SBP around 140s and DBP 90s-100s. Has been feeling like it might be high due to more frequent headaches and feeling lightheaded. Lightheadedness has been more recent within the last few days and comes on while he is in a resting position. Admits to not being as hydrated as he was in the past. More sedentary as he is working at home and helping to take care of father. Admits to eating processed foods & not adhering to a low sodium diet. Tobacco abuse- Vapes with nicotine 0.3mg daily. Stopped smoking cigarettes 5-6yrs ago. Essential tremor-Takes Propanolol which was rx by previous provider. Was also intended to help with lowering his blood pressure and feelings of anxiety. Has helped w anxiety and tremor. Presents today with concerns regarding hair loss. Started using minoxifil last month      Review of Systems   HENT: Negative for hearing loss. Eyes: Negative for blurred vision. Respiratory: Negative for shortness of breath. Cardiovascular: Negative for chest pain and palpitations. Gastrointestinal: Negative for abdominal pain, nausea and vomiting. Neurological: Negative for headaches. Lightheaded   BP (!) 134/97 (BP 1 Location: Left arm)   Temp 97 °F (36.1 °C)   Ht 5' 7\" (1.702 m)   Wt 195 lb 9.6 oz (88.7 kg)   BMI 30.64 kg/m²       Physical Exam  Vitals reviewed. Constitutional:       Appearance: Normal appearance. HENT:      Mouth/Throat:      Comments: MASK  Eyes:      Conjunctiva/sclera: Conjunctivae normal.      Pupils: Pupils are equal, round, and reactive to light. Cardiovascular:      Rate and Rhythm: Normal rate and regular rhythm. Pulses: Normal pulses. Heart sounds: Normal heart sounds.    Pulmonary:      Effort: Pulmonary effort is normal.      Breath sounds: Normal breath sounds. Abdominal:      General: Abdomen is flat. Musculoskeletal:      Right lower leg: No edema. Left lower leg: No edema. Psychiatric:         Mood and Affect: Mood normal.         ASSESSMENT and PLAN    ICD-10-CM ICD-9-CM    1. Primary hypertension  V62 921.0 METABOLIC PANEL, COMPREHENSIVE      MICROALBUMIN, UR, RAND W/ MICROALB/CREAT RATIO      lisinopriL (PRINIVIL, ZESTRIL) 40 mg tablet   2. Tremor, essential  G25.0 333.1    3. Screening, anemia, deficiency, iron  Z13.0 V78.0 CBC W/O DIFF   4. Screening, lipid  Z13.220 V77.91 LIPID PANEL   5. Establishing care with new doctor, encounter for  Z76.89 V65.8    Suspect lightheadedness could be from poorly controlled BP dehydration. Increasing lisinopril to 40mg. Advised pt that we may need to start an additional agent if BP still not at goal. Discussed at length the importance of taking blood pressure medication and adhering to regimen. Spent >10 min talking about cardiovasuar health, medications to avoid and foods to eliminate from diet. Also discussed lifestyle adjustments in diet and exercise. Important to get 150min exercise a week. Encouraged pt to check BP at home twice daily to review BP at next visit. Continue propanolol for tremor  Will discuss starting pt on Finasteride at next visit. Discussed SE of medication today. Counseled on tobacco cessation, NRT and harm reduction. Discussed at length for at least 5 min   Spent at least 15 min reviewing prior notes, labs & imaging from prior providers and an additional 30 min during todays vist  Follow-up and Dispositions    · Return in about 1 month (around 7/9/2022) for BP recheck, HTN.

## 2022-06-13 NOTE — PROGRESS NOTES
Please let pt know that cholesterol is mildly elevated. We will continue to monitor it periodically. In the mean time the best thing is to incorporate healthy lifestyle modifications such as adding more omega 3s to diet from fish and nut sources, eating more green leafy vegetables and cutting back on processed foods. In addition getting 150min of exercise a week. We will recheck in 6mo to see if any progress has been implemented.

## 2022-07-07 ENCOUNTER — OFFICE VISIT (OUTPATIENT)
Dept: INTERNAL MEDICINE CLINIC | Age: 47
End: 2022-07-07
Payer: COMMERCIAL

## 2022-07-07 VITALS
BODY MASS INDEX: 28.56 KG/M2 | SYSTOLIC BLOOD PRESSURE: 130 MMHG | RESPIRATION RATE: 20 BRPM | DIASTOLIC BLOOD PRESSURE: 88 MMHG | OXYGEN SATURATION: 99 % | HEIGHT: 67 IN | HEART RATE: 67 BPM | TEMPERATURE: 96.9 F | WEIGHT: 182 LBS

## 2022-07-07 DIAGNOSIS — Z71.6 ENCOUNTER FOR TOBACCO USE CESSATION COUNSELING: ICD-10-CM

## 2022-07-07 DIAGNOSIS — I10 PRIMARY HYPERTENSION: Primary | ICD-10-CM

## 2022-07-07 DIAGNOSIS — R42 DIZZINESS: ICD-10-CM

## 2022-07-07 DIAGNOSIS — Z72.0 VAPES NICOTINE CONTAINING SUBSTANCE: ICD-10-CM

## 2022-07-07 DIAGNOSIS — G25.0 TREMOR, ESSENTIAL: ICD-10-CM

## 2022-07-07 PROCEDURE — 99214 OFFICE O/P EST MOD 30 MIN: CPT | Performed by: STUDENT IN AN ORGANIZED HEALTH CARE EDUCATION/TRAINING PROGRAM

## 2022-07-07 PROCEDURE — 99406 BEHAV CHNG SMOKING 3-10 MIN: CPT | Performed by: STUDENT IN AN ORGANIZED HEALTH CARE EDUCATION/TRAINING PROGRAM

## 2022-07-07 RX ORDER — PROPRANOLOL HYDROCHLORIDE 20 MG/1
TABLET ORAL
Qty: 180 TABLET | Refills: 0 | Status: SHIPPED | OUTPATIENT
Start: 2022-07-07 | End: 2022-09-28

## 2022-07-07 RX ORDER — LISINOPRIL 40 MG/1
40 TABLET ORAL DAILY
Qty: 90 TABLET | Refills: 0 | Status: SHIPPED | OUTPATIENT
Start: 2022-07-07 | End: 2022-09-28

## 2022-07-07 NOTE — PROGRESS NOTES
Girma Joyce. is a 41 y.o. male (: 1975) presenting to address:    Chief Complaint   Patient presents with    Hypertension       Vitals:    22 1333   BP: (!) 142/100   Pulse: 67   Resp: 20   Temp: 96.9 °F (36.1 °C)   TempSrc: Temporal   SpO2: 99%   Weight: 182 lb (82.6 kg)   Height: 5' 7\" (1.702 m)   PainSc:   0 - No pain       Hearing/Vision:   No exam data present    Learning Assessment:     Learning Assessment 2020   PRIMARY LEARNER Patient   BARRIERS PRIMARY LEARNER -   PRIMARY LANGUAGE ENGLISH   LEARNER PREFERENCE PRIMARY DEMONSTRATION     READING     VIDEOS     LISTENING     PICTURES   ANSWERED BY patient   RELATIONSHIP SELF     Depression Screening:     3 most recent PHQ Screens 2022   Little interest or pleasure in doing things Not at all   Feeling down, depressed, irritable, or hopeless Not at all   Total Score PHQ 2 0     Fall Risk Assessment:     Fall Risk Assessment, last 12 mths 2018   Able to walk? Yes   Fall in past 12 months? No     Abuse Screening:     Abuse Screening Questionnaire 2018   Do you ever feel afraid of your partner? N   Are you in a relationship with someone who physically or mentally threatens you? N   Is it safe for you to go home? Y     Coordination of Care Questionaire:     Advanced Directive:   1. Do you have an Advanced Directive? NO    2. Would you like information on Advanced Directives? NO    1. \"Have you been to the ER, urgent care clinic since your last visit? Hospitalized since your last visit? \" No    2. \"Have you seen or consulted any other health care providers outside of the [address] [place] [address] since your last visit? \" No     3. For patients aged 39-70: Has the patient had a colonoscopy? No     If the patient is female:    4. For patients aged 41-77: Has the patient had a mammogram within the past 2 years? No    5. For patients aged 21-65: Has the patient had a pap smear?  No

## 2022-07-07 NOTE — PROGRESS NOTES
HISTORY OF PRESENT ILLNESS  Katie Camp is a 41 y.o. male. HTN- Increased Lisinopril at last visit. Checking BP at home. Last reading 118/75. Still with occasional dizziness but this started before the medication. Making meals at home. Has cut back on sodium, working on limiting 1500mg. Exercising more, lost 12 lbs since last visit. BP Readings from Last 3 Encounters:  07/07/22 : 130/88  06/09/22 : (!) 134/97  02/17/20 : (!) 160/112    Hx of vertigo- Happened 5 yrs ago. Started to experience again a few days before our first visit. Was instructed on exercises to perform to help with improvement. Tobacco abuse- Quit smoking cigarettes in 2015. Vapes the lowest nicotine dose. Review of Systems   HENT: Negative for hearing loss. Eyes: Negative for blurred vision. Respiratory: Negative for shortness of breath. Cardiovascular: Negative for chest pain and palpitations. Neurological: Positive for dizziness. Negative for headaches. Physical Exam  Vitals reviewed. Constitutional:       Appearance: Normal appearance. HENT:      Mouth/Throat:      Comments: MASK  Eyes:      Conjunctiva/sclera: Conjunctivae normal.      Pupils: Pupils are equal, round, and reactive to light. Cardiovascular:      Rate and Rhythm: Normal rate and regular rhythm. Pulses: Normal pulses. Heart sounds: Normal heart sounds. Pulmonary:      Effort: Pulmonary effort is normal.      Breath sounds: Normal breath sounds. Abdominal:      General: Abdomen is flat. Psychiatric:         Mood and Affect: Mood normal.         ASSESSMENT and PLAN    ICD-10-CM ICD-9-CM    1. Primary hypertension  I10 401.9 lisinopriL (PRINIVIL, ZESTRIL) 40 mg tablet   2. Tremor, essential  G25.0 333.1 propranoloL (INDERAL) 20 mg tablet   3. Dizziness  R42 780.4    4. Vapes nicotine containing substance  Z72.0 305.1    5.  Encounter for tobacco use cessation counseling  Z71.6 V65.42    BP well controlled on Lisinopril 40mg, will continue. Encouraged to continue to make healthy lifestyle changes. Discussed importance of limiting sodium in diet and getting 150min of exercise a day. Continue propranolol for tremor  If dizziness is persistent or worsens will Rx Meclizine for vertigo. Counseled on tobacco cessation, NRT and harm reduction. Encouraged to participate in nicotine quit now program. Discussed at length for at least 5 min   Follow-up and Dispositions    · Return in about 3 months (around 10/7/2022) for HTN.

## 2022-09-27 DIAGNOSIS — I10 PRIMARY HYPERTENSION: ICD-10-CM

## 2022-09-27 DIAGNOSIS — G25.0 TREMOR, ESSENTIAL: ICD-10-CM

## 2022-09-28 RX ORDER — PROPRANOLOL HYDROCHLORIDE 20 MG/1
TABLET ORAL
Qty: 60 TABLET | Refills: 2 | Status: SHIPPED | OUTPATIENT
Start: 2022-09-28

## 2022-09-28 RX ORDER — LISINOPRIL 40 MG/1
TABLET ORAL
Qty: 30 TABLET | Refills: 2 | Status: SHIPPED | OUTPATIENT
Start: 2022-09-28

## 2022-12-19 DIAGNOSIS — G25.0 TREMOR, ESSENTIAL: ICD-10-CM

## 2022-12-19 DIAGNOSIS — I10 PRIMARY HYPERTENSION: ICD-10-CM

## 2022-12-19 RX ORDER — PROPRANOLOL HYDROCHLORIDE 20 MG/1
TABLET ORAL
Qty: 180 TABLET | Refills: 0 | Status: SHIPPED | OUTPATIENT
Start: 2022-12-19

## 2022-12-19 RX ORDER — LISINOPRIL 40 MG/1
TABLET ORAL
Qty: 90 TABLET | Refills: 0 | Status: SHIPPED | OUTPATIENT
Start: 2022-12-19

## 2023-03-17 DIAGNOSIS — I10 ESSENTIAL (PRIMARY) HYPERTENSION: ICD-10-CM

## 2023-03-17 DIAGNOSIS — G25.0 ESSENTIAL TREMOR: ICD-10-CM

## 2023-03-21 RX ORDER — PROPRANOLOL HYDROCHLORIDE 20 MG/1
TABLET ORAL
Qty: 60 TABLET | Refills: 0 | Status: SHIPPED | OUTPATIENT
Start: 2023-03-21

## 2023-03-21 RX ORDER — LISINOPRIL 40 MG/1
TABLET ORAL
Qty: 30 TABLET | Refills: 0 | Status: SHIPPED | OUTPATIENT
Start: 2023-03-21

## 2023-04-12 DIAGNOSIS — G25.0 ESSENTIAL TREMOR: ICD-10-CM

## 2023-04-12 DIAGNOSIS — I10 ESSENTIAL (PRIMARY) HYPERTENSION: ICD-10-CM

## 2023-04-17 RX ORDER — LISINOPRIL 40 MG/1
TABLET ORAL
Qty: 30 TABLET | Refills: 0 | Status: SHIPPED | OUTPATIENT
Start: 2023-04-17 | End: 2023-04-20 | Stop reason: SDUPTHER

## 2023-04-17 RX ORDER — PROPRANOLOL HYDROCHLORIDE 20 MG/1
TABLET ORAL
Qty: 60 TABLET | Refills: 0 | Status: SHIPPED | OUTPATIENT
Start: 2023-04-17 | End: 2023-04-20 | Stop reason: SDUPTHER

## 2023-04-20 ENCOUNTER — OFFICE VISIT (OUTPATIENT)
Facility: CLINIC | Age: 48
End: 2023-04-20
Payer: COMMERCIAL

## 2023-04-20 ENCOUNTER — HOSPITAL ENCOUNTER (OUTPATIENT)
Facility: HOSPITAL | Age: 48
Setting detail: SPECIMEN
Discharge: HOME | End: 2023-04-20
Payer: COMMERCIAL

## 2023-04-20 VITALS
TEMPERATURE: 96.8 F | HEIGHT: 67 IN | DIASTOLIC BLOOD PRESSURE: 100 MMHG | WEIGHT: 187 LBS | OXYGEN SATURATION: 99 % | BODY MASS INDEX: 29.35 KG/M2 | RESPIRATION RATE: 22 BRPM | SYSTOLIC BLOOD PRESSURE: 139 MMHG | HEART RATE: 71 BPM

## 2023-04-20 DIAGNOSIS — K92.1 BLOOD IN STOOL: ICD-10-CM

## 2023-04-20 DIAGNOSIS — R73.09 ELEVATED GLUCOSE: ICD-10-CM

## 2023-04-20 DIAGNOSIS — Z12.11 COLON CANCER SCREENING: ICD-10-CM

## 2023-04-20 DIAGNOSIS — R59.1 LYMPHADENOPATHY: Primary | ICD-10-CM

## 2023-04-20 DIAGNOSIS — E78.2 MIXED HYPERLIPIDEMIA: ICD-10-CM

## 2023-04-20 DIAGNOSIS — R59.1 LYMPHADENOPATHY: ICD-10-CM

## 2023-04-20 DIAGNOSIS — I10 ESSENTIAL (PRIMARY) HYPERTENSION: ICD-10-CM

## 2023-04-20 DIAGNOSIS — G25.0 ESSENTIAL TREMOR: ICD-10-CM

## 2023-04-20 LAB
ALBUMIN SERPL-MCNC: 4.4 G/DL (ref 3.4–5)
ALBUMIN/GLOB SERPL: 1.3 (ref 0.8–1.7)
ALP SERPL-CCNC: 71 U/L (ref 45–117)
ALT SERPL-CCNC: 66 U/L (ref 16–61)
ANION GAP SERPL CALC-SCNC: 5 MMOL/L (ref 3–18)
AST SERPL-CCNC: 31 U/L (ref 10–38)
BASOPHILS # BLD: 0.1 K/UL (ref 0–0.1)
BASOPHILS NFR BLD: 1 % (ref 0–2)
BILIRUB SERPL-MCNC: 0.9 MG/DL (ref 0.2–1)
BUN SERPL-MCNC: 12 MG/DL (ref 7–18)
BUN/CREAT SERPL: 13 (ref 12–20)
CALCIUM SERPL-MCNC: 9.5 MG/DL (ref 8.5–10.1)
CHLORIDE SERPL-SCNC: 104 MMOL/L (ref 100–111)
CHOLEST SERPL-MCNC: 237 MG/DL
CO2 SERPL-SCNC: 28 MMOL/L (ref 21–32)
CREAT SERPL-MCNC: 0.96 MG/DL (ref 0.6–1.3)
DIFFERENTIAL METHOD BLD: ABNORMAL
EOSINOPHIL # BLD: 0.2 K/UL (ref 0–0.4)
EOSINOPHIL NFR BLD: 2 % (ref 0–5)
ERYTHROCYTE [DISTWIDTH] IN BLOOD BY AUTOMATED COUNT: 12.2 % (ref 11.6–14.5)
EST. AVERAGE GLUCOSE BLD GHB EST-MCNC: 123 MG/DL
GLOBULIN SER CALC-MCNC: 3.4 G/DL (ref 2–4)
GLUCOSE SERPL-MCNC: 138 MG/DL (ref 74–99)
HBA1C MFR BLD: 5.9 % (ref 4.2–5.6)
HCT VFR BLD AUTO: 49.1 % (ref 36–48)
HDLC SERPL-MCNC: 51 MG/DL (ref 40–60)
HDLC SERPL: 4.6 (ref 0–5)
HGB BLD-MCNC: 16 G/DL (ref 13–16)
IMM GRANULOCYTES # BLD AUTO: 0 K/UL (ref 0–0.04)
IMM GRANULOCYTES NFR BLD AUTO: 1 % (ref 0–0.5)
LDLC SERPL CALC-MCNC: 144 MG/DL (ref 0–100)
LIPID PANEL: ABNORMAL
LYMPHOCYTES # BLD: 1.9 K/UL (ref 0.9–3.6)
LYMPHOCYTES NFR BLD: 26 % (ref 21–52)
MCH RBC QN AUTO: 30.1 PG (ref 24–34)
MCHC RBC AUTO-ENTMCNC: 32.6 G/DL (ref 31–37)
MCV RBC AUTO: 92.5 FL (ref 78–100)
MONOCYTES # BLD: 0.7 K/UL (ref 0.05–1.2)
MONOCYTES NFR BLD: 9 % (ref 3–10)
NEUTS SEG # BLD: 4.7 K/UL (ref 1.8–8)
NEUTS SEG NFR BLD: 62 % (ref 40–73)
NRBC # BLD: 0 K/UL (ref 0–0.01)
NRBC BLD-RTO: 0 PER 100 WBC
PLATELET # BLD AUTO: 373 K/UL (ref 135–420)
PMV BLD AUTO: 10.3 FL (ref 9.2–11.8)
POTASSIUM SERPL-SCNC: 4.4 MMOL/L (ref 3.5–5.5)
PROT SERPL-MCNC: 7.8 G/DL (ref 6.4–8.2)
RBC # BLD AUTO: 5.31 M/UL (ref 4.35–5.65)
SODIUM SERPL-SCNC: 137 MMOL/L (ref 136–145)
TRIGL SERPL-MCNC: 210 MG/DL
VLDLC SERPL CALC-MCNC: 42 MG/DL
WBC # BLD AUTO: 7.6 K/UL (ref 4.6–13.2)

## 2023-04-20 PROCEDURE — 45378 DIAGNOSTIC COLONOSCOPY: CPT | Performed by: STUDENT IN AN ORGANIZED HEALTH CARE EDUCATION/TRAINING PROGRAM

## 2023-04-20 PROCEDURE — 3080F DIAST BP >= 90 MM HG: CPT | Performed by: STUDENT IN AN ORGANIZED HEALTH CARE EDUCATION/TRAINING PROGRAM

## 2023-04-20 PROCEDURE — 85025 COMPLETE CBC W/AUTO DIFF WBC: CPT

## 2023-04-20 PROCEDURE — 99214 OFFICE O/P EST MOD 30 MIN: CPT | Performed by: STUDENT IN AN ORGANIZED HEALTH CARE EDUCATION/TRAINING PROGRAM

## 2023-04-20 PROCEDURE — 36415 COLL VENOUS BLD VENIPUNCTURE: CPT

## 2023-04-20 PROCEDURE — 80053 COMPREHEN METABOLIC PANEL: CPT

## 2023-04-20 PROCEDURE — 80061 LIPID PANEL: CPT

## 2023-04-20 PROCEDURE — 3075F SYST BP GE 130 - 139MM HG: CPT | Performed by: STUDENT IN AN ORGANIZED HEALTH CARE EDUCATION/TRAINING PROGRAM

## 2023-04-20 PROCEDURE — 83036 HEMOGLOBIN GLYCOSYLATED A1C: CPT

## 2023-04-20 RX ORDER — HYDROCHLOROTHIAZIDE 12.5 MG/1
12.5 CAPSULE, GELATIN COATED ORAL DAILY
Qty: 30 CAPSULE | Refills: 0 | Status: SHIPPED | OUTPATIENT
Start: 2023-04-20

## 2023-04-20 RX ORDER — PROPRANOLOL HYDROCHLORIDE 20 MG/1
20 TABLET ORAL 2 TIMES DAILY
Qty: 180 TABLET | Refills: 0 | Status: SHIPPED | OUTPATIENT
Start: 2023-04-20

## 2023-04-20 RX ORDER — LISINOPRIL 40 MG/1
40 TABLET ORAL DAILY
Qty: 90 TABLET | Refills: 0 | Status: SHIPPED | OUTPATIENT
Start: 2023-04-20

## 2023-04-20 SDOH — ECONOMIC STABILITY: FOOD INSECURITY: WITHIN THE PAST 12 MONTHS, YOU WORRIED THAT YOUR FOOD WOULD RUN OUT BEFORE YOU GOT MONEY TO BUY MORE.: NEVER TRUE

## 2023-04-20 SDOH — ECONOMIC STABILITY: HOUSING INSECURITY
IN THE LAST 12 MONTHS, WAS THERE A TIME WHEN YOU DID NOT HAVE A STEADY PLACE TO SLEEP OR SLEPT IN A SHELTER (INCLUDING NOW)?: NO

## 2023-04-20 SDOH — ECONOMIC STABILITY: INCOME INSECURITY: HOW HARD IS IT FOR YOU TO PAY FOR THE VERY BASICS LIKE FOOD, HOUSING, MEDICAL CARE, AND HEATING?: NOT HARD AT ALL

## 2023-04-20 SDOH — ECONOMIC STABILITY: FOOD INSECURITY: WITHIN THE PAST 12 MONTHS, THE FOOD YOU BOUGHT JUST DIDN'T LAST AND YOU DIDN'T HAVE MONEY TO GET MORE.: NEVER TRUE

## 2023-04-20 ASSESSMENT — PATIENT HEALTH QUESTIONNAIRE - PHQ9
SUM OF ALL RESPONSES TO PHQ QUESTIONS 1-9: 0
2. FEELING DOWN, DEPRESSED OR HOPELESS: 0
SUM OF ALL RESPONSES TO PHQ QUESTIONS 1-9: 0
1. LITTLE INTEREST OR PLEASURE IN DOING THINGS: 0
SUM OF ALL RESPONSES TO PHQ9 QUESTIONS 1 & 2: 0
SUM OF ALL RESPONSES TO PHQ QUESTIONS 1-9: 0
SUM OF ALL RESPONSES TO PHQ QUESTIONS 1-9: 0

## 2023-04-20 ASSESSMENT — ENCOUNTER SYMPTOMS
SHORTNESS OF BREATH: 0
ABDOMINAL PAIN: 0

## 2023-04-20 NOTE — PROGRESS NOTES
Tia Freeman. is a 41 y.o. male (: 1975) presenting to address:    Chief Complaint   Patient presents with    Hypertension    Lymphadenopathy       BP (!) 139/100   Pulse 71   Temp 96.8 °F (36 °C) (Skin)   Resp 22   Ht 5' 7\" (1.702 m)   Wt 187 lb (84.8 kg)   SpO2 99%   BMI 29.29 kg/m²    No flowsheet data found. Hearing/Vision:   No results found. Learning Assessment:   No flowsheet data found. Depression Screening:   No flowsheet data found. Fall Risk Assessment:   No flowsheet data found. Abuse Screening:   No flowsheet data found. Coordination of Care Questionaire:     Advanced Directive:   1. Do you have an Advanced Directive? No    2. Would you like information on Advanced Directives? No    1. \"Have you been to the ER, urgent care clinic since your last visit? Hospitalized since your last visit? \" [facility]    2. \"Have you seen or consulted any other health care providers outside of the [address] [place] [address] since your last visit? \" [facility]    3. For patients aged 39-70: Has the patient had a colonoscopy? No    If the patient is female:    4. For patients aged 41-77: Has the patient had a mammogram within the past 2 years? No    5. For patients aged 21-65: Has the patient had a pap smear?  No
6. Elevated glucose  R73.09 Comprehensive Metabolic Panel     Hemoglobin A1C      7. Colon cancer screening  Z12.11 COLONOSCOPY,DIAGNOSTIC     External Referral To Gastroenterology      Suspect reactive lymphadenitis. Ordered neck US and CBC to r/o blood abnormality    BP not controlled on lisinopril 40mg. Adding HCTZ 12.5mg today. Will check BMP at next visit    HLD diet controlled. Suspect internal hemorrhoid. Referral to GI for blood in stool. Advised to increase fiber and hydration    Return in about 1 month (around 5/20/2023) for BP recheck, HTN.

## 2023-04-21 NOTE — RESULT ENCOUNTER NOTE
The results of your blood work shows that your A1C is in the prediabetic range, your glucose is elevated and your cholesterol is improving but is still mildly elevated. I suggest that you continue to implement healthy lifestyle changes with modifications in your diet by cutting out refined sugars, substituting white bread for whole wheat and eating more fresh fruits and vegetables. In addition you should work on getting 150 min of aerobic exercise a week. We will repeat these labs in 6 months to assess your progress. Your CBC was stable and shows no sign of infection.

## 2023-05-11 ENCOUNTER — HOSPITAL ENCOUNTER (OUTPATIENT)
Facility: HOSPITAL | Age: 48
Discharge: HOME | End: 2023-05-11
Payer: COMMERCIAL

## 2023-05-11 DIAGNOSIS — R59.1 LYMPHADENOPATHY: ICD-10-CM

## 2023-05-11 PROCEDURE — 76536 US EXAM OF HEAD AND NECK: CPT

## 2023-05-12 DIAGNOSIS — I10 ESSENTIAL (PRIMARY) HYPERTENSION: ICD-10-CM

## 2023-05-17 DIAGNOSIS — I10 ESSENTIAL (PRIMARY) HYPERTENSION: ICD-10-CM

## 2023-05-17 RX ORDER — HYDROCHLOROTHIAZIDE 12.5 MG/1
CAPSULE, GELATIN COATED ORAL
Qty: 30 CAPSULE | Refills: 0 | Status: SHIPPED | OUTPATIENT
Start: 2023-05-17

## 2023-05-17 RX ORDER — HYDROCHLOROTHIAZIDE 12.5 MG/1
12.5 CAPSULE, GELATIN COATED ORAL DAILY
Qty: 30 CAPSULE | Refills: 0 | OUTPATIENT
Start: 2023-05-17

## 2023-05-18 NOTE — RESULT ENCOUNTER NOTE
Your recent ultrasound shows a small benign non suspicious appearing lymphnode. No further work up is indicated.

## 2023-05-19 ENCOUNTER — OFFICE VISIT (OUTPATIENT)
Facility: CLINIC | Age: 48
End: 2023-05-19
Payer: COMMERCIAL

## 2023-05-19 VITALS
WEIGHT: 183 LBS | SYSTOLIC BLOOD PRESSURE: 138 MMHG | TEMPERATURE: 97.2 F | RESPIRATION RATE: 18 BRPM | HEART RATE: 67 BPM | BODY MASS INDEX: 28.72 KG/M2 | OXYGEN SATURATION: 100 % | DIASTOLIC BLOOD PRESSURE: 90 MMHG | HEIGHT: 67 IN

## 2023-05-19 DIAGNOSIS — I10 ESSENTIAL (PRIMARY) HYPERTENSION: Primary | ICD-10-CM

## 2023-05-19 DIAGNOSIS — Z71.6 TOBACCO ABUSE COUNSELING: ICD-10-CM

## 2023-05-19 DIAGNOSIS — Z72.0 TOBACCO USE: ICD-10-CM

## 2023-05-19 PROCEDURE — 99406 BEHAV CHNG SMOKING 3-10 MIN: CPT | Performed by: STUDENT IN AN ORGANIZED HEALTH CARE EDUCATION/TRAINING PROGRAM

## 2023-05-19 PROCEDURE — 99213 OFFICE O/P EST LOW 20 MIN: CPT | Performed by: STUDENT IN AN ORGANIZED HEALTH CARE EDUCATION/TRAINING PROGRAM

## 2023-05-19 PROCEDURE — 3079F DIAST BP 80-89 MM HG: CPT | Performed by: STUDENT IN AN ORGANIZED HEALTH CARE EDUCATION/TRAINING PROGRAM

## 2023-05-19 PROCEDURE — 3075F SYST BP GE 130 - 139MM HG: CPT | Performed by: STUDENT IN AN ORGANIZED HEALTH CARE EDUCATION/TRAINING PROGRAM

## 2023-05-19 ASSESSMENT — PATIENT HEALTH QUESTIONNAIRE - PHQ9
SUM OF ALL RESPONSES TO PHQ QUESTIONS 1-9: 0
2. FEELING DOWN, DEPRESSED OR HOPELESS: 0
SUM OF ALL RESPONSES TO PHQ9 QUESTIONS 1 & 2: 0
SUM OF ALL RESPONSES TO PHQ QUESTIONS 1-9: 0
SUM OF ALL RESPONSES TO PHQ QUESTIONS 1-9: 0
1. LITTLE INTEREST OR PLEASURE IN DOING THINGS: 0
SUM OF ALL RESPONSES TO PHQ QUESTIONS 1-9: 0

## 2023-05-19 ASSESSMENT — ENCOUNTER SYMPTOMS
ABDOMINAL PAIN: 0
SHORTNESS OF BREATH: 0

## 2023-05-19 NOTE — PROGRESS NOTES
HISTORY OF PRESENT ILLNESS  James Beltrán. is a 57 y.o. male presenting today for   Chief Complaint   Patient presents with    Hypertension    Blood Pressure Check        HTN- Taking lisinopril 40mg daily. Checking BP at home, todays reading 112/82. He tried taking HCTZ and started keto at the same time and noticed he was lightheaded and some burning with urination so he stopped. He has been loosing some weight as a result of keto    Tobacco use- Stopped smoking cigarettes and starting vaping, he has tried to stop vaping and then succumbs to willpower. He is wanting to quit and open to options to help with this. Review of Systems   Eyes:  Negative for visual disturbance. Respiratory:  Negative for shortness of breath. Cardiovascular:  Negative for chest pain. Gastrointestinal:  Negative for abdominal pain. Neurological:  Negative for headaches. BP (!) 138/90   Pulse 67   Temp 97.2 °F (36.2 °C) (Skin)   Resp 18   Ht 5' 7\" (1.702 m)   Wt 183 lb (83 kg)   SpO2 100%   BMI 28.66 kg/m²     Physical Exam  HENT:      Mouth/Throat:      Comments: MASK  Eyes:      Pupils: Pupils are equal, round, and reactive to light. Cardiovascular:      Rate and Rhythm: Normal rate and regular rhythm. Pulmonary:      Effort: Pulmonary effort is normal.      Breath sounds: Normal breath sounds. Musculoskeletal:      Right lower leg: No edema. Left lower leg: No edema. Psychiatric:         Mood and Affect: Mood normal.       ASSESSMENT and PLAN    ICD-10-CM    1. Essential (primary) hypertension  I10       2. Tobacco use  Z72.0       3. Tobacco abuse counseling  Z71.6       Suspect white coat. BP controlled at home. Will continue to monitor with just lisinopril. If BP contniue to drop will lower dosage. Encouraged to continue to check BP at home    Counseled on tobacco cessation, NRT and harm reduction.  Encouraged to participate in nicotine quit now program. Discussed at length for at least 5

## 2023-05-19 NOTE — PROGRESS NOTES
Rhett Pineda is a 57 y.o. male (: 1975) presenting to address:    Chief Complaint   Patient presents with    Hypertension    Blood Pressure Check       Pulse 67   Temp 97.2 °F (36.2 °C) (Skin)   Ht 5' 7\" (1.702 m)   Wt 183 lb (83 kg)   SpO2 100%   BMI 28.66 kg/m²    No flowsheet data found. Hearing/Vision:   No results found. Learning Assessment:   No flowsheet data found. Depression Screening:   No flowsheet data found. Fall Risk Assessment:   No flowsheet data found. Abuse Screening:   No flowsheet data found. Coordination of Care Questionaire:     Advanced Directive:   1. Do you have an Advanced Directive? No    2. Would you like information on Advanced Directives? No    1. \"Have you been to the ER, urgent care clinic since your last visit? Hospitalized since your last visit? \" No    2. \"Have you seen or consulted any other health care providers outside of the [address] [place] [address] since your last visit? \" No    3. For patients aged 39-70: Has the patient had a colonoscopy? No    If the patient is female:    4. For patients aged 41-77: Has the patient had a mammogram within the past 2 years? No    5. For patients aged 21-65: Has the patient had a pap smear?  No

## 2023-06-01 ENCOUNTER — TELEPHONE (OUTPATIENT)
Facility: CLINIC | Age: 48
End: 2023-06-01

## 2023-06-01 NOTE — TELEPHONE ENCOUNTER
PLEASE FORWARD TO PCP WITH MEDICATIONS ATTACHED TO MESSAGE. This patient contacted the office for the following prescriptions to be refilled:    Medication requested :     DrugName: Hydrochlorothiazide  Dosage- 12.5 mg    Pharmacy requesting 90 day supply      Pharmacy: [facility]    PCP: Surjit Levy DO  LOV: 05/19/2023   NOV GSMA: 8/22/2023  FUTURE APPT:   Future Appointments   Date Time Provider [address] [place] [address]   8/22/2023 11:30 AM Kya Davey DO [facility]MA BS AMB         Thank you.

## 2023-08-18 DIAGNOSIS — I10 ESSENTIAL (PRIMARY) HYPERTENSION: ICD-10-CM

## 2023-08-18 DIAGNOSIS — G25.0 ESSENTIAL TREMOR: ICD-10-CM

## 2023-08-21 RX ORDER — PROPRANOLOL HYDROCHLORIDE 20 MG/1
TABLET ORAL
Qty: 180 TABLET | Refills: 0 | Status: SHIPPED | OUTPATIENT
Start: 2023-08-21

## 2023-08-21 RX ORDER — LISINOPRIL 40 MG/1
TABLET ORAL
Qty: 90 TABLET | Refills: 0 | Status: SHIPPED | OUTPATIENT
Start: 2023-08-21

## 2023-09-28 NOTE — PROGRESS NOTES
Saba Nichole is a 41 y.o. male (: 1975) presenting to address:    Chief Complaint   Patient presents with    Establish Care       Vitals:    22 1432   Temp: 97 °F (36.1 °C)   Weight: 195 lb 9.6 oz (88.7 kg)   Height: 5' 7\" (1.702 m)   PainSc:   0 - No pain       Hearing/Vision:   No exam data present    Learning Assessment:     Learning Assessment 2020   PRIMARY LEARNER Patient   BARRIERS PRIMARY LEARNER -   PRIMARY LANGUAGE ENGLISH   LEARNER PREFERENCE PRIMARY DEMONSTRATION     READING     VIDEOS     LISTENING     PICTURES   ANSWERED BY patient   RELATIONSHIP SELF     Depression Screening:     3 most recent PHQ Screens 2022   Little interest or pleasure in doing things Not at all   Feeling down, depressed, irritable, or hopeless Not at all   Total Score PHQ 2 0     Fall Risk Assessment:     Fall Risk Assessment, last 12 mths 2018   Able to walk? Yes   Fall in past 12 months? No     Abuse Screening:     Abuse Screening Questionnaire 2018   Do you ever feel afraid of your partner? N   Are you in a relationship with someone who physically or mentally threatens you? N   Is it safe for you to go home? Y     ADL Assessment:     ADL Assessment 2018   Feeding yourself No Help Needed   Getting from bed to chair No Help Needed   Getting dressed No Help Needed   Bathing or showering No Help Needed   Walk across the room (includes cane/walker) No Help Needed   Using the telphone No Help Needed   Taking your medications No Help Needed   Preparing meals No Help Needed   Managing money (expenses/bills) No Help Needed   Moderately strenuous housework (laundry) No Help Needed   Shopping for personal items (toiletries/medicines) No Help Needed   Shopping for groceries No Help Needed   Driving No Help Needed   Climbing a flight of stairs No Help Needed   Getting to places beyond walking distances No Help Needed        Coordination of Care Questionaire:   1.  \"Have you been to the ER, urgent care clinic since your last visit? Hospitalized since your last visit? \" No    2. \"Have you seen or consulted any other health care providers outside of the [address] [place] [address] since your last visit? \" No     3. For patients aged 39-70: Has the patient had a colonoscopy / FIT/ Cologuard? No      If the patient is female:    4. For patients aged 41-77: Has the patient had a mammogram within the past 2 years? NA - based on age or sex  See top three    5. For patients aged 21-65: Has the patient had a pap smear? NA - based on age or sex    Advanced Directive:   1. Do you have an Advanced Directive? NO    2. Would you like information on Advanced Directives?  NO Burow's Graft Text: The defect edges were debeveled with a #15 scalpel blade.  Given the location of the defect, shape of the defect, the proximity to free margins and the presence of a standing cone deformity a Burow's skin graft was deemed most appropriate. The standing cone was removed and this tissue was then trimmed to the shape of the primary defect. The adipose tissue was also removed until only dermis and epidermis were left.  The skin margins of the secondary defect were undermined to an appropriate distance in all directions utilizing iris scissors.  The secondary defect was closed with interrupted buried subcutaneous sutures.  The skin edges were then re-apposed with running  sutures.  The skin graft was then placed in the primary defect and oriented appropriately.

## 2023-11-16 ENCOUNTER — TELEPHONE (OUTPATIENT)
Facility: CLINIC | Age: 48
End: 2023-11-16

## 2023-11-16 NOTE — TELEPHONE ENCOUNTER
Pt requesting refill to be sent to [facility] on [address]. Last Appointment:  5/19/2023  No future appointments.      lisinopril (PRINIVIL;ZESTRIL) 40 MG tablet [[id number]]     Order Details  Dose, Route, Frequency: As Directed   Dispense Quantity: 90 tablet Refills: 0          Sig: TAKE 1 TABLET BY MOUTH EVERY DAY     propranolol (INDERAL) 20 MG tablet [[id number]]     Order Details  Dose, Route, Frequency: As Directed   Dispense Quantity: 180 tablet Refills: 0          Sig: TAKE 1 TABLET BY MOUTH TWICE A DAY

## 2023-11-19 DIAGNOSIS — I10 ESSENTIAL (PRIMARY) HYPERTENSION: ICD-10-CM

## 2023-11-19 DIAGNOSIS — G25.0 ESSENTIAL TREMOR: ICD-10-CM

## 2023-11-20 RX ORDER — PROPRANOLOL HYDROCHLORIDE 20 MG/1
TABLET ORAL
Qty: 180 TABLET | Refills: 0 | Status: SHIPPED | OUTPATIENT
Start: 2023-11-20

## 2023-11-20 RX ORDER — LISINOPRIL 40 MG/1
TABLET ORAL
Qty: 90 TABLET | Refills: 0 | Status: SHIPPED | OUTPATIENT
Start: 2023-11-20

## 2024-01-24 ENCOUNTER — OFFICE VISIT (OUTPATIENT)
Facility: CLINIC | Age: 49
End: 2024-01-24
Payer: COMMERCIAL

## 2024-01-24 VITALS
OXYGEN SATURATION: 100 % | DIASTOLIC BLOOD PRESSURE: 86 MMHG | HEART RATE: 69 BPM | HEIGHT: 67 IN | BODY MASS INDEX: 30.29 KG/M2 | RESPIRATION RATE: 18 BRPM | SYSTOLIC BLOOD PRESSURE: 135 MMHG | TEMPERATURE: 96.9 F | WEIGHT: 193 LBS

## 2024-01-24 DIAGNOSIS — Z71.6 TOBACCO ABUSE COUNSELING: ICD-10-CM

## 2024-01-24 DIAGNOSIS — Z13.89 OBESITY SCREEN: ICD-10-CM

## 2024-01-24 DIAGNOSIS — E66.9 CLASS 1 OBESITY WITH BODY MASS INDEX (BMI) OF 30.0 TO 30.9 IN ADULT, UNSPECIFIED OBESITY TYPE, UNSPECIFIED WHETHER SERIOUS COMORBIDITY PRESENT: ICD-10-CM

## 2024-01-24 DIAGNOSIS — Z12.11 COLON CANCER SCREENING: ICD-10-CM

## 2024-01-24 DIAGNOSIS — Z72.0 TOBACCO USE: ICD-10-CM

## 2024-01-24 DIAGNOSIS — E78.2 MIXED HYPERLIPIDEMIA: ICD-10-CM

## 2024-01-24 DIAGNOSIS — I10 ESSENTIAL (PRIMARY) HYPERTENSION: Primary | ICD-10-CM

## 2024-01-24 DIAGNOSIS — R73.03 PREDIABETES: ICD-10-CM

## 2024-01-24 PROCEDURE — 99214 OFFICE O/P EST MOD 30 MIN: CPT | Performed by: STUDENT IN AN ORGANIZED HEALTH CARE EDUCATION/TRAINING PROGRAM

## 2024-01-24 PROCEDURE — 3079F DIAST BP 80-89 MM HG: CPT | Performed by: STUDENT IN AN ORGANIZED HEALTH CARE EDUCATION/TRAINING PROGRAM

## 2024-01-24 PROCEDURE — 99406 BEHAV CHNG SMOKING 3-10 MIN: CPT | Performed by: STUDENT IN AN ORGANIZED HEALTH CARE EDUCATION/TRAINING PROGRAM

## 2024-01-24 PROCEDURE — 3075F SYST BP GE 130 - 139MM HG: CPT | Performed by: STUDENT IN AN ORGANIZED HEALTH CARE EDUCATION/TRAINING PROGRAM

## 2024-01-24 PROCEDURE — 99401 PREV MED CNSL INDIV APPRX 15: CPT | Performed by: STUDENT IN AN ORGANIZED HEALTH CARE EDUCATION/TRAINING PROGRAM

## 2024-01-24 ASSESSMENT — PATIENT HEALTH QUESTIONNAIRE - PHQ9
1. LITTLE INTEREST OR PLEASURE IN DOING THINGS: 0
SUM OF ALL RESPONSES TO PHQ QUESTIONS 1-9: 0
SUM OF ALL RESPONSES TO PHQ9 QUESTIONS 1 & 2: 0
SUM OF ALL RESPONSES TO PHQ QUESTIONS 1-9: 0
SUM OF ALL RESPONSES TO PHQ QUESTIONS 1-9: 0
2. FEELING DOWN, DEPRESSED OR HOPELESS: 0
SUM OF ALL RESPONSES TO PHQ QUESTIONS 1-9: 0

## 2024-01-24 ASSESSMENT — ENCOUNTER SYMPTOMS
ABDOMINAL PAIN: 0
SHORTNESS OF BREATH: 0

## 2024-01-24 NOTE — PROGRESS NOTES
HISTORY OF PRESENT ILLNESS  Keyon Fair Jr. is a 48 y.o. male presenting today for   Chief Complaint   Patient presents with    Hypertension        HTN- Taking lisinopril 40mg daily. Checking BP at home SBP 120s. Denies changes in vision hearing or headaches.     HLD- Diet controlled. Has put on some weight but intends on getting back to losing this.          Review of Systems   Eyes:  Negative for visual disturbance.   Respiratory:  Negative for shortness of breath.    Cardiovascular:  Negative for chest pain.   Gastrointestinal:  Negative for abdominal pain.   Neurological:  Negative for headaches.         /86   Pulse 69   Temp 96.9 °F (36.1 °C) (Skin)   Resp 18   Ht 1.702 m (5' 7\")   Wt 87.5 kg (193 lb)   SpO2 100%   BMI 30.23 kg/m²     Physical Exam  HENT:      Mouth/Throat:      Comments: MASK  Eyes:      Pupils: Pupils are equal, round, and reactive to light.   Cardiovascular:      Rate and Rhythm: Normal rate and regular rhythm.   Pulmonary:      Effort: Pulmonary effort is normal.      Breath sounds: Normal breath sounds.   Musculoskeletal:      Right lower leg: No edema.      Left lower leg: No edema.   Psychiatric:         Mood and Affect: Mood normal.         ASSESSMENT and PLAN    ICD-10-CM    1. Essential (primary) hypertension  I10       2. Mixed hyperlipidemia  E78.2       3. Prediabetes  R73.03       4. Class 1 obesity with body mass index (BMI) of 30.0 to 30.9 in adult, unspecified obesity type, unspecified whether serious comorbidity present  E66.9     Z68.30       5. Tobacco use  Z72.0       6. Tobacco abuse counseling  Z71.6       7. Colon cancer screening  Z12.11 External Referral To Gastroenterology      8. Obesity screen  Z13.89         HTN-Chronic  -Stable. Continue current dose of Lisinopril.   -Encouraged to check BP at home  -Discussed importance of limiting sodium in diet and getting 150min of exercise a week.     HLD-Chronic  -Currently diet controlled.   -Encouraged to

## 2024-01-24 NOTE — PROGRESS NOTES
1. \"Have you been to the ER, urgent care clinic since your last visit?  Hospitalized since your last visit?\" No    2. \"Have you seen or consulted any other health care providers outside of the [facility] since your last visit?\" No    3. For patients aged 45-75: Has the patient had a colonoscopy / FIT/ Cologuard? No      If the patient is female:    4. For patients aged 40-74: Has the patient had a mammogram within the past 2 years? NA - based on age or sex      5. For patients aged 21-65: Has the patient had a pap smear? NA - based on age or sex

## 2024-02-19 DIAGNOSIS — I10 ESSENTIAL (PRIMARY) HYPERTENSION: ICD-10-CM

## 2024-02-19 DIAGNOSIS — G25.0 ESSENTIAL TREMOR: ICD-10-CM

## 2024-02-19 RX ORDER — LISINOPRIL 40 MG/1
TABLET ORAL
Qty: 90 TABLET | Refills: 0 | Status: SHIPPED | OUTPATIENT
Start: 2024-02-19

## 2024-02-19 RX ORDER — PROPRANOLOL HYDROCHLORIDE 20 MG/1
TABLET ORAL
Qty: 180 TABLET | Refills: 0 | Status: SHIPPED | OUTPATIENT
Start: 2024-02-19

## 2024-04-24 ENCOUNTER — OFFICE VISIT (OUTPATIENT)
Facility: CLINIC | Age: 49
End: 2024-04-24
Payer: COMMERCIAL

## 2024-04-24 VITALS
WEIGHT: 190.4 LBS | HEART RATE: 69 BPM | HEIGHT: 67 IN | TEMPERATURE: 97.3 F | RESPIRATION RATE: 20 BRPM | SYSTOLIC BLOOD PRESSURE: 138 MMHG | OXYGEN SATURATION: 98 % | BODY MASS INDEX: 29.88 KG/M2 | DIASTOLIC BLOOD PRESSURE: 86 MMHG

## 2024-04-24 DIAGNOSIS — Z12.5 SCREENING FOR MALIGNANT NEOPLASM OF PROSTATE: ICD-10-CM

## 2024-04-24 DIAGNOSIS — Z72.0 VAPES NICOTINE CONTAINING SUBSTANCE: ICD-10-CM

## 2024-04-24 DIAGNOSIS — Z00.00 ROUTINE GENERAL MEDICAL EXAMINATION AT A HEALTH CARE FACILITY: Primary | ICD-10-CM

## 2024-04-24 DIAGNOSIS — I10 ESSENTIAL (PRIMARY) HYPERTENSION: ICD-10-CM

## 2024-04-24 DIAGNOSIS — R73.03 PREDIABETES: ICD-10-CM

## 2024-04-24 DIAGNOSIS — R10.30 LOWER ABDOMINAL PAIN: ICD-10-CM

## 2024-04-24 DIAGNOSIS — Z71.6 TOBACCO ABUSE COUNSELING: ICD-10-CM

## 2024-04-24 DIAGNOSIS — G25.0 ESSENTIAL TREMOR: ICD-10-CM

## 2024-04-24 DIAGNOSIS — Z12.11 COLON CANCER SCREENING: ICD-10-CM

## 2024-04-24 DIAGNOSIS — E78.2 MIXED HYPERLIPIDEMIA: ICD-10-CM

## 2024-04-24 PROCEDURE — 3079F DIAST BP 80-89 MM HG: CPT | Performed by: STUDENT IN AN ORGANIZED HEALTH CARE EDUCATION/TRAINING PROGRAM

## 2024-04-24 PROCEDURE — 3075F SYST BP GE 130 - 139MM HG: CPT | Performed by: STUDENT IN AN ORGANIZED HEALTH CARE EDUCATION/TRAINING PROGRAM

## 2024-04-24 PROCEDURE — 99396 PREV VISIT EST AGE 40-64: CPT | Performed by: STUDENT IN AN ORGANIZED HEALTH CARE EDUCATION/TRAINING PROGRAM

## 2024-04-24 PROCEDURE — 99214 OFFICE O/P EST MOD 30 MIN: CPT | Performed by: STUDENT IN AN ORGANIZED HEALTH CARE EDUCATION/TRAINING PROGRAM

## 2024-04-24 PROCEDURE — 99406 BEHAV CHNG SMOKING 3-10 MIN: CPT | Performed by: STUDENT IN AN ORGANIZED HEALTH CARE EDUCATION/TRAINING PROGRAM

## 2024-04-24 RX ORDER — PROPRANOLOL HYDROCHLORIDE 20 MG/1
20 TABLET ORAL 2 TIMES DAILY
Qty: 180 TABLET | Refills: 2 | Status: SHIPPED | OUTPATIENT
Start: 2024-04-24

## 2024-04-24 RX ORDER — LISINOPRIL 40 MG/1
40 TABLET ORAL DAILY
Qty: 90 TABLET | Refills: 2 | Status: SHIPPED | OUTPATIENT
Start: 2024-04-24

## 2024-04-24 SDOH — HEALTH STABILITY: PHYSICAL HEALTH: ON AVERAGE, HOW MANY MINUTES DO YOU ENGAGE IN EXERCISE AT THIS LEVEL?: 30 MIN

## 2024-04-24 SDOH — HEALTH STABILITY: PHYSICAL HEALTH: ON AVERAGE, HOW MANY DAYS PER WEEK DO YOU ENGAGE IN MODERATE TO STRENUOUS EXERCISE (LIKE A BRISK WALK)?: 1 DAY

## 2024-04-24 SDOH — SOCIAL STABILITY: SOCIAL NETWORK
DO YOU BELONG TO ANY CLUBS OR ORGANIZATIONS SUCH AS CHURCH GROUPS UNIONS, FRATERNAL OR ATHLETIC GROUPS, OR SCHOOL GROUPS?: PATIENT DECLINED

## 2024-04-24 SDOH — SOCIAL STABILITY: SOCIAL INSECURITY: WITHIN THE LAST YEAR, HAVE YOU BEEN AFRAID OF YOUR PARTNER OR EX-PARTNER?: PATIENT DECLINED

## 2024-04-24 SDOH — ECONOMIC STABILITY: FOOD INSECURITY: WITHIN THE PAST 12 MONTHS, THE FOOD YOU BOUGHT JUST DIDN'T LAST AND YOU DIDN'T HAVE MONEY TO GET MORE.: NEVER TRUE

## 2024-04-24 SDOH — ECONOMIC STABILITY: INCOME INSECURITY: IN THE LAST 12 MONTHS, WAS THERE A TIME WHEN YOU WERE NOT ABLE TO PAY THE MORTGAGE OR RENT ON TIME?: PATIENT DECLINED

## 2024-04-24 SDOH — SOCIAL STABILITY: SOCIAL NETWORK: IN A TYPICAL WEEK, HOW MANY TIMES DO YOU TALK ON THE PHONE WITH FAMILY, FRIENDS, OR NEIGHBORS?: PATIENT DECLINED

## 2024-04-24 SDOH — ECONOMIC STABILITY: TRANSPORTATION INSECURITY
IN THE PAST 12 MONTHS, HAS THE LACK OF TRANSPORTATION KEPT YOU FROM MEDICAL APPOINTMENTS OR FROM GETTING MEDICATIONS?: NO

## 2024-04-24 SDOH — SOCIAL STABILITY: SOCIAL NETWORK: HOW OFTEN DO YOU ATTENT MEETINGS OF THE CLUB OR ORGANIZATION YOU BELONG TO?: PATIENT DECLINED

## 2024-04-24 SDOH — ECONOMIC STABILITY: FOOD INSECURITY: WITHIN THE PAST 12 MONTHS, YOU WORRIED THAT YOUR FOOD WOULD RUN OUT BEFORE YOU GOT MONEY TO BUY MORE.: NEVER TRUE

## 2024-04-24 SDOH — SOCIAL STABILITY: SOCIAL NETWORK: HOW OFTEN DO YOU GET TOGETHER WITH FRIENDS OR RELATIVES?: PATIENT DECLINED

## 2024-04-24 SDOH — SOCIAL STABILITY: SOCIAL NETWORK: ARE YOU MARRIED, WIDOWED, DIVORCED, SEPARATED, NEVER MARRIED, OR LIVING WITH A PARTNER?: PATIENT DECLINED

## 2024-04-24 SDOH — ECONOMIC STABILITY: INCOME INSECURITY: HOW HARD IS IT FOR YOU TO PAY FOR THE VERY BASICS LIKE FOOD, HOUSING, MEDICAL CARE, AND HEATING?: NOT HARD AT ALL

## 2024-04-24 SDOH — SOCIAL STABILITY: SOCIAL INSECURITY
WITHIN THE LAST YEAR, HAVE YOU BEEN KICKED, HIT, SLAPPED, OR OTHERWISE PHYSICALLY HURT BY YOUR PARTNER OR EX-PARTNER?: PATIENT DECLINED

## 2024-04-24 SDOH — SOCIAL STABILITY: SOCIAL INSECURITY
WITHIN THE LAST YEAR, HAVE TO BEEN RAPED OR FORCED TO HAVE ANY KIND OF SEXUAL ACTIVITY BY YOUR PARTNER OR EX-PARTNER?: PATIENT DECLINED

## 2024-04-24 SDOH — SOCIAL STABILITY: SOCIAL NETWORK: HOW OFTEN DO YOU ATTEND CHURCH OR RELIGIOUS SERVICES?: PATIENT DECLINED

## 2024-04-24 SDOH — HEALTH STABILITY: MENTAL HEALTH: HOW OFTEN DO YOU HAVE A DRINK CONTAINING ALCOHOL?: PATIENT DECLINED

## 2024-04-24 SDOH — HEALTH STABILITY: MENTAL HEALTH
STRESS IS WHEN SOMEONE FEELS TENSE, NERVOUS, ANXIOUS, OR CAN'T SLEEP AT NIGHT BECAUSE THEIR MIND IS TROUBLED. HOW STRESSED ARE YOU?: PATIENT DECLINED

## 2024-04-24 SDOH — SOCIAL STABILITY: SOCIAL INSECURITY
WITHIN THE LAST YEAR, HAVE YOU BEEN HUMILIATED OR EMOTIONALLY ABUSED IN OTHER WAYS BY YOUR PARTNER OR EX-PARTNER?: PATIENT DECLINED

## 2024-04-24 SDOH — HEALTH STABILITY: MENTAL HEALTH: HOW MANY STANDARD DRINKS CONTAINING ALCOHOL DO YOU HAVE ON A TYPICAL DAY?: PATIENT DECLINED

## 2024-04-24 ASSESSMENT — VISUAL ACUITY
OS_CC: 20/20
OD_CC: 20/20

## 2024-04-24 ASSESSMENT — PATIENT HEALTH QUESTIONNAIRE - PHQ9
SUM OF ALL RESPONSES TO PHQ QUESTIONS 1-9: 0
SUM OF ALL RESPONSES TO PHQ QUESTIONS 1-9: 0
2. FEELING DOWN, DEPRESSED OR HOPELESS: NOT AT ALL
SUM OF ALL RESPONSES TO PHQ QUESTIONS 1-9: 0
1. LITTLE INTEREST OR PLEASURE IN DOING THINGS: NOT AT ALL
SUM OF ALL RESPONSES TO PHQ9 QUESTIONS 1 & 2: 0
SUM OF ALL RESPONSES TO PHQ QUESTIONS 1-9: 0

## 2024-04-24 ASSESSMENT — ENCOUNTER SYMPTOMS
ABDOMINAL PAIN: 1
SHORTNESS OF BREATH: 0

## 2024-04-24 NOTE — PROGRESS NOTES
\"Have you been to the ER, urgent care clinic since your last visit?  Hospitalized since your last visit?\"    NO    “Have you seen or consulted any other health care providers outside of [facility] since your last visit?”    NO    “Have you had a colorectal cancer screening such as a colonoscopy/FIT/Cologuard?    NO    No colonoscopy on file  No cologuard on file  No FIT/FOBT on file   No flexible sigmoidoscopy on file

## 2024-04-24 NOTE — PROGRESS NOTES
Well Adult Note    Keyon Fair Jr. is a 48 y.o. male who is here for well adult exam.  History of Present Illness  PMHx: HTN  Social hx: Admits vaping, ETOH smokes medical marijuana  Sexual hx:Not currently     Eats a well balanced diet. Works out and is active. Occupation: Not currently working  Last dentist visit- Not recent  Last eye visit- Every 2 yrs. Wears corrective lens.   HM-Has yet to have colonoscopy    Abd pain- Loc in LLQ. Worse over the 6 mo ago. Came on suddenly. Described as being a dull ache. Worse with certain movements. Denies any noticeable buldge in the groin. No changes in bowel or blood in stool    Review of Systems   Eyes:  Negative for visual disturbance.   Respiratory:  Negative for shortness of breath.    Cardiovascular:  Negative for chest pain.   Gastrointestinal:  Positive for abdominal pain (LLQ).   Neurological:  Negative for headaches.         Allergies   Allergen Reactions    Penicillin G Itching and Other (See Comments)         Past Medical History:   Diagnosis Date    Prehypertension        Family History   Problem Relation Age of Onset    Thyroid Disease Mother        Social History     Socioeconomic History    Marital status: Unknown     Spouse name: Not on file    Number of children: Not on file    Years of education: Not on file    Highest education level: Not on file   Occupational History    Not on file   Tobacco Use    Smoking status: Some Days     Current packs/day: 1.00     Average packs/day: 1 pack/day for 20.0 years (20.0 ttl pk-yrs)     Types: Cigarettes     Passive exposure: Never    Smokeless tobacco: Former     Quit date: 1/1/2016    Tobacco comments:     Quit smoking: patient vapes   Substance and Sexual Activity    Alcohol use: Yes    Drug use: Yes    Sexual activity: Not on file   Other Topics Concern    Not on file   Social History Narrative    Not on file     Social Determinants of Health     Financial Resource Strain: Low Risk  (4/24/2024)    Overall

## 2024-04-25 LAB
A/G RATIO: 2 RATIO (ref 1.1–2.6)
ALBUMIN: 4.9 G/DL (ref 3.5–5)
ALP BLD-CCNC: 72 U/L (ref 25–115)
ALT SERPL-CCNC: 40 U/L (ref 5–40)
ANION GAP SERPL CALCULATED.3IONS-SCNC: 12 MMOL/L (ref 3–15)
AST SERPL-CCNC: 23 U/L (ref 10–37)
BILIRUB SERPL-MCNC: 0.6 MG/DL (ref 0.2–1.2)
BUN BLDV-MCNC: 16 MG/DL (ref 6–22)
CALCIUM SERPL-MCNC: 9.9 MG/DL (ref 8.4–10.5)
CHLORIDE BLD-SCNC: 99 MMOL/L (ref 98–110)
CHOLESTEROL/HDL RATIO: 5.8 (ref 0–5)
CHOLESTEROL: 260 MG/DL (ref 110–200)
CO2: 26 MMOL/L (ref 20–32)
CREAT SERPL-MCNC: 0.9 MG/DL (ref 0.5–1.2)
ESTIMATED AVERAGE GLUCOSE: 131 MG/DL (ref 91–123)
GLOBULIN: 2.5 G/DL (ref 2–4)
GLOMERULAR FILTRATION RATE: >60 ML/MIN/1.73 SQ.M.
GLUCOSE: 117 MG/DL (ref 70–99)
HBA1C MFR BLD: 6.2 % (ref 4.8–5.6)
HDLC SERPL-MCNC: 45 MG/DL
LDL CHOLESTEROL CALCULATED: 178 MG/DL (ref 50–99)
LDL/HDL RATIO: 4
NON-HDL CHOLESTEROL: 215 MG/DL
POTASSIUM SERPL-SCNC: 4.5 MMOL/L (ref 3.5–5.5)
PROSTATE SPECIFIC ANTIGEN: 0.76 NG/ML
SODIUM BLD-SCNC: 137 MMOL/L (ref 133–145)
TOTAL PROTEIN: 7.4 G/DL (ref 6.4–8.3)
TRIGL SERPL-MCNC: 184 MG/DL (ref 40–149)
VLDLC SERPL CALC-MCNC: 37 MG/DL (ref 8–30)

## 2024-04-26 NOTE — RESULT ENCOUNTER NOTE
Your A1C has increased and is close to diabetic territory. Diabetes is diagnosed with an A1C of >6.4. I would strongly suggest that your work on improving your dietary habits by reducing processed foods, fast foods and white bread, rice and sugar/soda intake in addition increasing your physical activity. Your cholesterol has elevated, your ASCVD which calculates your 10 yr risk for heart attack and stroke is increased due to your levels being elevated and your history of hypertension. It is strongly suggested to start a medication called a statin to help reduce these numbers. If you are agreeable I will send in a medication called Crestor 10mg daily.

## 2024-04-29 ENCOUNTER — OFFICE VISIT (OUTPATIENT)
Age: 49
End: 2024-04-29
Payer: COMMERCIAL

## 2024-04-29 VITALS
SYSTOLIC BLOOD PRESSURE: 139 MMHG | HEART RATE: 50 BPM | BODY MASS INDEX: 30.13 KG/M2 | HEIGHT: 67 IN | TEMPERATURE: 97 F | WEIGHT: 192 LBS | DIASTOLIC BLOOD PRESSURE: 88 MMHG | OXYGEN SATURATION: 97 %

## 2024-04-29 DIAGNOSIS — R10.32 LEFT LOWER QUADRANT ABDOMINAL PAIN: Primary | ICD-10-CM

## 2024-04-29 DIAGNOSIS — R03.0 ELEVATED BLOOD PRESSURE READING: ICD-10-CM

## 2024-04-29 DIAGNOSIS — E66.3 OVERWEIGHT (BMI 25.0-29.9): ICD-10-CM

## 2024-04-29 PROCEDURE — 99204 OFFICE O/P NEW MOD 45 MIN: CPT | Performed by: SURGERY

## 2024-04-29 NOTE — PROGRESS NOTES
General Surgery Consult      Keyon Fair Jr.  Admit date: (Not on file)    MRN: [id number]     : 1975     Age: 49 y.o.        Attending Physician: Anila Alford MD, [facility]      History of Present Illness:     Keyon Fair Jr. is a 49 y.o. male who was referred to me by Dr. Kya Rodriguez for evaluation of left lower quadrant abdominal pain and possible abdominal wall hernia.  The patient stated that he thinks he figured out why he has been having the pain is because he bought some type of a chair and has been sitting for hours on it with his left knee flexed.  He stated that he stopped doing it and his pain went away and last week he did again and the pain came back.  He denies any bulge or mass.  He denies any change in bowel habits and he denies any previous abdominal surgeries.  He had a CT scan 4 years ago that did not show any abdominal wall hernias but no recent imaging.    Patient Active Problem List    Diagnosis Date Noted    Piriformis syndrome, right 2018    Elevated blood pressure reading 2018    Vitamin D deficiency 2018    Excessive sweating 2018    Tremor, essential 2018    Overweight (BMI 25.0-29.9) 2018     Past Medical History:   Diagnosis Date    Hypertension     Prehypertension       Past Surgical History:   Procedure Laterality Date    WISDOM TOOTH EXTRACTION        Social History     Tobacco Use    Smoking status: Some Days     Current packs/day: 1.00     Average packs/day: 1 pack/day for 20.0 years (20.0 ttl pk-yrs)     Types: Cigarettes     Passive exposure: Never    Smokeless tobacco: Former     Quit date: 2016    Tobacco comments:     Quit smoking: patient vapes   Substance Use Topics    Alcohol use: Yes      Social History     Tobacco Use   Smoking Status Some Days    Current packs/day: 1.00    Average packs/day: 1 pack/day for 20.0 years (20.0 ttl pk-yrs)    Types: Cigarettes    Passive exposure: Never   Smokeless Tobacco Former    Quit

## 2024-04-29 NOTE — PROGRESS NOTES
Keyon Fair Jr. is a 49 y.o. male (: 1975) presenting to address:    Chief Complaint   Patient presents with    New Patient     Abdominal pain       Medication list and allergies have been reviewed with Keyon Fair Jr. and updated as of today's date.     I have gone over all Medical, Surgical and Social History with Keyon Fair Jr. and updated/added the information accordingly.

## 2024-10-24 ENCOUNTER — OFFICE VISIT (OUTPATIENT)
Facility: CLINIC | Age: 49
End: 2024-10-24

## 2024-10-24 VITALS
WEIGHT: 187 LBS | HEART RATE: 61 BPM | HEIGHT: 68 IN | BODY MASS INDEX: 28.34 KG/M2 | OXYGEN SATURATION: 98 % | DIASTOLIC BLOOD PRESSURE: 82 MMHG | RESPIRATION RATE: 20 BRPM | SYSTOLIC BLOOD PRESSURE: 140 MMHG | TEMPERATURE: 97 F

## 2024-10-24 DIAGNOSIS — E78.2 MIXED HYPERLIPIDEMIA: ICD-10-CM

## 2024-10-24 DIAGNOSIS — N20.0 KIDNEY STONE: ICD-10-CM

## 2024-10-24 DIAGNOSIS — Z71.6 TOBACCO ABUSE COUNSELING: ICD-10-CM

## 2024-10-24 DIAGNOSIS — R73.03 PREDIABETES: ICD-10-CM

## 2024-10-24 DIAGNOSIS — I10 ESSENTIAL (PRIMARY) HYPERTENSION: Primary | ICD-10-CM

## 2024-10-24 DIAGNOSIS — Z72.0 VAPES NICOTINE CONTAINING SUBSTANCE: ICD-10-CM

## 2024-10-24 ASSESSMENT — PATIENT HEALTH QUESTIONNAIRE - PHQ9
SUM OF ALL RESPONSES TO PHQ QUESTIONS 1-9: 0
SUM OF ALL RESPONSES TO PHQ9 QUESTIONS 1 & 2: 0
SUM OF ALL RESPONSES TO PHQ QUESTIONS 1-9: 0
SUM OF ALL RESPONSES TO PHQ QUESTIONS 1-9: 0
2. FEELING DOWN, DEPRESSED OR HOPELESS: NOT AT ALL
1. LITTLE INTEREST OR PLEASURE IN DOING THINGS: NOT AT ALL
SUM OF ALL RESPONSES TO PHQ QUESTIONS 1-9: 0

## 2024-10-24 ASSESSMENT — ENCOUNTER SYMPTOMS
SHORTNESS OF BREATH: 0
ABDOMINAL PAIN: 0

## 2024-10-24 NOTE — PROGRESS NOTES
HISTORY OF PRESENT ILLNESS  Keyon Fair Jr. is a 49 y.o. male presenting today for   Chief Complaint   Patient presents with    Hypertension        HTN- Taking lisinopril 40mg.  Denies vision changes or worsening headaches.    Abd pain- Est with surgery in April and elected to defer CT at that time. Initially started off with hematuria and presented to urgent care was dx with UTI. It then presented to He then presented to the ER on 9/20/24 for sudden right flank pain. CT abd/pelvis revealed kidney stones. He was Rx flomax and referred to urology where he was seen as outpatient on 9/26/24 and advised to continue flomax. 10/21/24 pt had lithotripsy procedure performed. He notes since the procedure he notes some mild colicy pain and a red tinge to his urine but this was to be expected.     Prediabetes-Diet controlled. He is unsure how his numbers got elevated in April as he does not eat very much processed foods or simple carbohydrates he also denies eating sweets.  He does however and he wonders if the sweetness of the flavors could be contributing.        Medications reviewed and updated.    Review of Systems   Eyes:  Negative for visual disturbance.   Respiratory:  Negative for shortness of breath.    Cardiovascular:  Negative for chest pain.   Gastrointestinal:  Negative for abdominal pain.   Genitourinary:  Positive for flank pain (mild).   Neurological:  Negative for headaches.         BP (!) 140/82 (Site: Left Upper Arm) Comment: manual  Pulse 61   Temp 97 °F (36.1 °C) (Skin)   Resp 20   Ht 1.727 m (5' 8\")   Wt 84.8 kg (187 lb)   SpO2 98%   BMI 28.43 kg/m²     Physical Exam  Vitals reviewed.   Constitutional:       Appearance: Normal appearance.   HENT:      Mouth/Throat:      Comments: MASK  Eyes:      Pupils: Pupils are equal, round, and reactive to light.   Cardiovascular:      Rate and Rhythm: Normal rate and regular rhythm.      Pulses: Normal pulses.   Pulmonary:      Effort: Pulmonary effort

## 2024-10-24 NOTE — PROGRESS NOTES
\"Have you been to the ER, urgent care clinic since your last visit?  Hospitalized since your last visit?\"    YES - When: approximately 1 months ago.  Where and Why: [facility].    “Have you seen or consulted any other health care providers outside of [facility] since your last visit?”    YES - When: approximately 1 months ago.  Where and Why: urology.    “Have you had a colorectal cancer screening such as a colonoscopy/FIT/Cologuard?    NO    No colonoscopy on file  No cologuard on file  No FIT/FOBT on file   No flexible sigmoidoscopy on file

## 2024-11-15 ENCOUNTER — TELEPHONE (OUTPATIENT)
Facility: CLINIC | Age: 49
End: 2024-11-15

## 2024-11-15 NOTE — TELEPHONE ENCOUNTER
Patient is calling in asking if a new GI referral can be done.  States he was having Insurance issues when the last one was done back in April so he was never seen.

## 2024-11-30 DIAGNOSIS — I10 ESSENTIAL (PRIMARY) HYPERTENSION: ICD-10-CM

## 2024-12-02 RX ORDER — LISINOPRIL 40 MG/1
40 TABLET ORAL DAILY
Qty: 90 TABLET | Refills: 2 | Status: SHIPPED | OUTPATIENT
Start: 2024-12-02

## 2024-12-02 NOTE — TELEPHONE ENCOUNTER
Last Appointment:  10/24/2024  Future Appointments   Date Time Provider Department Center   2/10/2025  2:30 PM [email or web address] [place] Sched   2/10/2025  3:20 PM Rand Newberry APRN - NP [facility] [place] Sched   5/2/2025  1:30 PM Kya Rdoriguez DO [facility] DEP

## 2025-01-06 ENCOUNTER — COMMUNITY OUTREACH (OUTPATIENT)
Facility: CLINIC | Age: 50
End: 2025-01-06

## 2025-01-06 NOTE — PROGRESS NOTES
Patient's HM shows they are overdue for Colorectal Screening.   Care Everywhere and  files searched.  No results to attach to order nor HM updated.      Referral from 4/24/24 states patient declined service.

## 2025-03-03 DIAGNOSIS — G25.0 ESSENTIAL TREMOR: ICD-10-CM

## 2025-03-03 RX ORDER — PROPRANOLOL HCL 20 MG
20 TABLET ORAL 2 TIMES DAILY
Qty: 180 TABLET | Refills: 2 | Status: SHIPPED | OUTPATIENT
Start: 2025-03-03

## 2025-03-03 NOTE — TELEPHONE ENCOUNTER
Last Appointment:  10/24/2024  Future Appointments   Date Time Provider Department Center   5/2/2025  1:30 PM Kya Rodriguez DO [facility] DEP

## 2025-08-27 DIAGNOSIS — I10 ESSENTIAL (PRIMARY) HYPERTENSION: ICD-10-CM

## 2025-08-27 RX ORDER — LISINOPRIL 40 MG/1
40 TABLET ORAL DAILY
Qty: 90 TABLET | Refills: 2 | Status: SHIPPED | OUTPATIENT
Start: 2025-08-27